# Patient Record
Sex: MALE | Race: BLACK OR AFRICAN AMERICAN | NOT HISPANIC OR LATINO | URBAN - METROPOLITAN AREA
[De-identification: names, ages, dates, MRNs, and addresses within clinical notes are randomized per-mention and may not be internally consistent; named-entity substitution may affect disease eponyms.]

---

## 2018-12-01 ENCOUNTER — OUTPATIENT (OUTPATIENT)
Dept: OUTPATIENT SERVICES | Facility: HOSPITAL | Age: 58
LOS: 1 days | End: 2018-12-01
Payer: MEDICAID

## 2018-12-01 PROCEDURE — G9001: CPT

## 2018-12-21 DIAGNOSIS — Z71.89 OTHER SPECIFIED COUNSELING: ICD-10-CM

## 2022-06-15 ENCOUNTER — INPATIENT (INPATIENT)
Facility: HOSPITAL | Age: 62
LOS: 0 days | Discharge: LEFT BEFORE DISCHARGE | DRG: 311 | End: 2022-06-15
Attending: HOSPITALIST | Admitting: HOSPITALIST
Payer: MEDICAID

## 2022-06-15 VITALS
RESPIRATION RATE: 18 BRPM | DIASTOLIC BLOOD PRESSURE: 80 MMHG | HEART RATE: 59 BPM | OXYGEN SATURATION: 100 % | SYSTOLIC BLOOD PRESSURE: 178 MMHG | TEMPERATURE: 98 F

## 2022-06-15 VITALS
RESPIRATION RATE: 20 BRPM | OXYGEN SATURATION: 99 % | TEMPERATURE: 98 F | SYSTOLIC BLOOD PRESSURE: 146 MMHG | DIASTOLIC BLOOD PRESSURE: 88 MMHG | HEIGHT: 70 IN | HEART RATE: 69 BPM | WEIGHT: 160.06 LBS

## 2022-06-15 DIAGNOSIS — R07.9 CHEST PAIN, UNSPECIFIED: ICD-10-CM

## 2022-06-15 DIAGNOSIS — F10.929 ALCOHOL USE, UNSPECIFIED WITH INTOXICATION, UNSPECIFIED: ICD-10-CM

## 2022-06-15 DIAGNOSIS — Z29.9 ENCOUNTER FOR PROPHYLACTIC MEASURES, UNSPECIFIED: ICD-10-CM

## 2022-06-15 DIAGNOSIS — Z78.9 OTHER SPECIFIED HEALTH STATUS: ICD-10-CM

## 2022-06-15 DIAGNOSIS — F22 DELUSIONAL DISORDERS: ICD-10-CM

## 2022-06-15 DIAGNOSIS — I10 ESSENTIAL (PRIMARY) HYPERTENSION: ICD-10-CM

## 2022-06-15 LAB
ALBUMIN SERPL ELPH-MCNC: 4.2 G/DL — SIGNIFICANT CHANGE UP (ref 3.3–5)
ALP SERPL-CCNC: 99 U/L — SIGNIFICANT CHANGE UP (ref 40–120)
ALT FLD-CCNC: 17 U/L — SIGNIFICANT CHANGE UP (ref 10–45)
AMPHET UR-MCNC: NEGATIVE — SIGNIFICANT CHANGE UP
ANION GAP SERPL CALC-SCNC: 11 MMOL/L — SIGNIFICANT CHANGE UP (ref 5–17)
APAP SERPL-MCNC: <15 UG/ML — SIGNIFICANT CHANGE UP (ref 10–30)
APPEARANCE UR: ABNORMAL
AST SERPL-CCNC: 22 U/L — SIGNIFICANT CHANGE UP (ref 10–40)
BACTERIA # UR AUTO: 0 — SIGNIFICANT CHANGE UP
BARBITURATES UR SCN-MCNC: NEGATIVE — SIGNIFICANT CHANGE UP
BASE EXCESS BLDV CALC-SCNC: 3.4 MMOL/L — HIGH (ref -2–2)
BASOPHILS # BLD AUTO: 0.03 K/UL — SIGNIFICANT CHANGE UP (ref 0–0.2)
BASOPHILS NFR BLD AUTO: 0.5 % — SIGNIFICANT CHANGE UP (ref 0–2)
BENZODIAZ UR-MCNC: NEGATIVE — SIGNIFICANT CHANGE UP
BILIRUB SERPL-MCNC: 0.5 MG/DL — SIGNIFICANT CHANGE UP (ref 0.2–1.2)
BILIRUB UR-MCNC: NEGATIVE — SIGNIFICANT CHANGE UP
BUN SERPL-MCNC: 13 MG/DL — SIGNIFICANT CHANGE UP (ref 7–23)
CA-I SERPL-SCNC: 1.17 MMOL/L — SIGNIFICANT CHANGE UP (ref 1.15–1.33)
CALCIUM SERPL-MCNC: 8.8 MG/DL — SIGNIFICANT CHANGE UP (ref 8.4–10.5)
CHLORIDE BLDV-SCNC: 103 MMOL/L — SIGNIFICANT CHANGE UP (ref 96–108)
CHLORIDE SERPL-SCNC: 103 MMOL/L — SIGNIFICANT CHANGE UP (ref 96–108)
CK MB BLD-MCNC: 1.8 % — SIGNIFICANT CHANGE UP (ref 0–3.5)
CK MB CFR SERPL CALC: 5.4 NG/ML — SIGNIFICANT CHANGE UP (ref 0–6.7)
CK SERPL-CCNC: 300 U/L — HIGH (ref 30–200)
CO2 BLDV-SCNC: 32 MMOL/L — HIGH (ref 22–26)
CO2 SERPL-SCNC: 26 MMOL/L — SIGNIFICANT CHANGE UP (ref 22–31)
COCAINE METAB.OTHER UR-MCNC: POSITIVE
COLOR SPEC: SIGNIFICANT CHANGE UP
CREAT SERPL-MCNC: 0.96 MG/DL — SIGNIFICANT CHANGE UP (ref 0.5–1.3)
DIFF PNL FLD: NEGATIVE — SIGNIFICANT CHANGE UP
EGFR: 91 ML/MIN/1.73M2 — SIGNIFICANT CHANGE UP
EOSINOPHIL # BLD AUTO: 0 K/UL — SIGNIFICANT CHANGE UP (ref 0–0.5)
EOSINOPHIL NFR BLD AUTO: 0 % — SIGNIFICANT CHANGE UP (ref 0–6)
EPI CELLS # UR: 0 /HPF — SIGNIFICANT CHANGE UP
ETHANOL SERPL-MCNC: 13 MG/DL — HIGH (ref 0–10)
GAS PNL BLDV: 134 MMOL/L — LOW (ref 136–145)
GAS PNL BLDV: SIGNIFICANT CHANGE UP
GAS PNL BLDV: SIGNIFICANT CHANGE UP
GLUCOSE BLDV-MCNC: 110 MG/DL — HIGH (ref 70–99)
GLUCOSE SERPL-MCNC: 113 MG/DL — HIGH (ref 70–99)
GLUCOSE UR QL: NEGATIVE — SIGNIFICANT CHANGE UP
HCO3 BLDV-SCNC: 30 MMOL/L — HIGH (ref 22–29)
HCT VFR BLD CALC: 37.2 % — LOW (ref 39–50)
HCT VFR BLDA CALC: 37 % — LOW (ref 39–51)
HGB BLD CALC-MCNC: 12.3 G/DL — LOW (ref 12.6–17.4)
HGB BLD-MCNC: 11.6 G/DL — LOW (ref 13–17)
HYALINE CASTS # UR AUTO: 0 /LPF — SIGNIFICANT CHANGE UP (ref 0–2)
IMM GRANULOCYTES NFR BLD AUTO: 0.3 % — SIGNIFICANT CHANGE UP (ref 0–1.5)
KETONES UR-MCNC: NEGATIVE — SIGNIFICANT CHANGE UP
LACTATE BLDV-MCNC: 1.5 MMOL/L — SIGNIFICANT CHANGE UP (ref 0.7–2)
LEUKOCYTE ESTERASE UR-ACNC: NEGATIVE — SIGNIFICANT CHANGE UP
LYMPHOCYTES # BLD AUTO: 1.7 K/UL — SIGNIFICANT CHANGE UP (ref 1–3.3)
LYMPHOCYTES # BLD AUTO: 29.1 % — SIGNIFICANT CHANGE UP (ref 13–44)
MCHC RBC-ENTMCNC: 23.7 PG — LOW (ref 27–34)
MCHC RBC-ENTMCNC: 31.2 GM/DL — LOW (ref 32–36)
MCV RBC AUTO: 76.1 FL — LOW (ref 80–100)
METHADONE UR-MCNC: NEGATIVE — SIGNIFICANT CHANGE UP
MONOCYTES # BLD AUTO: 0.63 K/UL — SIGNIFICANT CHANGE UP (ref 0–0.9)
MONOCYTES NFR BLD AUTO: 10.8 % — SIGNIFICANT CHANGE UP (ref 2–14)
NEUTROPHILS # BLD AUTO: 3.47 K/UL — SIGNIFICANT CHANGE UP (ref 1.8–7.4)
NEUTROPHILS NFR BLD AUTO: 59.3 % — SIGNIFICANT CHANGE UP (ref 43–77)
NITRITE UR-MCNC: NEGATIVE — SIGNIFICANT CHANGE UP
NRBC # BLD: 0 /100 WBCS — SIGNIFICANT CHANGE UP (ref 0–0)
NT-PROBNP SERPL-SCNC: 94 PG/ML — SIGNIFICANT CHANGE UP (ref 0–300)
OPIATES UR-MCNC: NEGATIVE — SIGNIFICANT CHANGE UP
OXYCODONE UR-MCNC: NEGATIVE — SIGNIFICANT CHANGE UP
PCO2 BLDV: 53 MMHG — SIGNIFICANT CHANGE UP (ref 42–55)
PCP SPEC-MCNC: SIGNIFICANT CHANGE UP
PCP UR-MCNC: NEGATIVE — SIGNIFICANT CHANGE UP
PH BLDV: 7.36 — SIGNIFICANT CHANGE UP (ref 7.32–7.43)
PH UR: 7.5 — SIGNIFICANT CHANGE UP (ref 5–8)
PLATELET # BLD AUTO: 187 K/UL — SIGNIFICANT CHANGE UP (ref 150–400)
PO2 BLDV: 37 MMHG — SIGNIFICANT CHANGE UP (ref 25–45)
POTASSIUM BLDV-SCNC: 4 MMOL/L — SIGNIFICANT CHANGE UP (ref 3.5–5.1)
POTASSIUM SERPL-MCNC: 4 MMOL/L — SIGNIFICANT CHANGE UP (ref 3.5–5.3)
POTASSIUM SERPL-SCNC: 4 MMOL/L — SIGNIFICANT CHANGE UP (ref 3.5–5.3)
PROT SERPL-MCNC: 6.5 G/DL — SIGNIFICANT CHANGE UP (ref 6–8.3)
PROT UR-MCNC: NEGATIVE — SIGNIFICANT CHANGE UP
RAPID RVP RESULT: SIGNIFICANT CHANGE UP
RBC # BLD: 4.89 M/UL — SIGNIFICANT CHANGE UP (ref 4.2–5.8)
RBC # FLD: 14.6 % — HIGH (ref 10.3–14.5)
RBC CASTS # UR COMP ASSIST: 3 /HPF — SIGNIFICANT CHANGE UP (ref 0–4)
SALICYLATES SERPL-MCNC: <2 MG/DL — LOW (ref 15–30)
SAO2 % BLDV: 63.6 % — LOW (ref 67–88)
SARS-COV-2 RNA SPEC QL NAA+PROBE: SIGNIFICANT CHANGE UP
SODIUM SERPL-SCNC: 140 MMOL/L — SIGNIFICANT CHANGE UP (ref 135–145)
SP GR SPEC: 1.02 — SIGNIFICANT CHANGE UP (ref 1.01–1.02)
THC UR QL: POSITIVE
TROPONIN T, HIGH SENSITIVITY RESULT: <6 NG/L — SIGNIFICANT CHANGE UP (ref 0–51)
TROPONIN T, HIGH SENSITIVITY RESULT: <6 NG/L — SIGNIFICANT CHANGE UP (ref 0–51)
UROBILINOGEN FLD QL: ABNORMAL
WBC # BLD: 5.85 K/UL — SIGNIFICANT CHANGE UP (ref 3.8–10.5)
WBC # FLD AUTO: 5.85 K/UL — SIGNIFICANT CHANGE UP (ref 3.8–10.5)
WBC UR QL: 0 /HPF — SIGNIFICANT CHANGE UP (ref 0–5)

## 2022-06-15 PROCEDURE — 83605 ASSAY OF LACTIC ACID: CPT

## 2022-06-15 PROCEDURE — 85025 COMPLETE CBC W/AUTO DIFF WBC: CPT

## 2022-06-15 PROCEDURE — 82947 ASSAY GLUCOSE BLOOD QUANT: CPT

## 2022-06-15 PROCEDURE — 85014 HEMATOCRIT: CPT

## 2022-06-15 PROCEDURE — G0378: CPT

## 2022-06-15 PROCEDURE — 83880 ASSAY OF NATRIURETIC PEPTIDE: CPT

## 2022-06-15 PROCEDURE — 82330 ASSAY OF CALCIUM: CPT

## 2022-06-15 PROCEDURE — 99284 EMERGENCY DEPT VISIT MOD MDM: CPT

## 2022-06-15 PROCEDURE — 85018 HEMOGLOBIN: CPT

## 2022-06-15 PROCEDURE — 99285 EMERGENCY DEPT VISIT HI MDM: CPT

## 2022-06-15 PROCEDURE — 71045 X-RAY EXAM CHEST 1 VIEW: CPT | Mod: 26

## 2022-06-15 PROCEDURE — 84295 ASSAY OF SERUM SODIUM: CPT

## 2022-06-15 PROCEDURE — 80053 COMPREHEN METABOLIC PANEL: CPT

## 2022-06-15 PROCEDURE — 82565 ASSAY OF CREATININE: CPT

## 2022-06-15 PROCEDURE — 71045 X-RAY EXAM CHEST 1 VIEW: CPT

## 2022-06-15 PROCEDURE — 0225U NFCT DS DNA&RNA 21 SARSCOV2: CPT

## 2022-06-15 PROCEDURE — 99223 1ST HOSP IP/OBS HIGH 75: CPT

## 2022-06-15 PROCEDURE — 81001 URINALYSIS AUTO W/SCOPE: CPT

## 2022-06-15 PROCEDURE — 82803 BLOOD GASES ANY COMBINATION: CPT

## 2022-06-15 PROCEDURE — 84484 ASSAY OF TROPONIN QUANT: CPT

## 2022-06-15 PROCEDURE — 93010 ELECTROCARDIOGRAM REPORT: CPT

## 2022-06-15 PROCEDURE — 80307 DRUG TEST PRSMV CHEM ANLYZR: CPT

## 2022-06-15 PROCEDURE — 82550 ASSAY OF CK (CPK): CPT

## 2022-06-15 PROCEDURE — 84132 ASSAY OF SERUM POTASSIUM: CPT

## 2022-06-15 PROCEDURE — 82553 CREATINE MB FRACTION: CPT

## 2022-06-15 PROCEDURE — 96372 THER/PROPH/DIAG INJ SC/IM: CPT

## 2022-06-15 PROCEDURE — 82435 ASSAY OF BLOOD CHLORIDE: CPT

## 2022-06-15 RX ORDER — ACETAMINOPHEN 500 MG
650 TABLET ORAL EVERY 6 HOURS
Refills: 0 | Status: DISCONTINUED | OUTPATIENT
Start: 2022-06-15 | End: 2022-06-15

## 2022-06-15 RX ORDER — ASPIRIN/CALCIUM CARB/MAGNESIUM 324 MG
1 TABLET ORAL
Qty: 30 | Refills: 0
Start: 2022-06-15 | End: 2022-07-14

## 2022-06-15 RX ORDER — LANOLIN ALCOHOL/MO/W.PET/CERES
3 CREAM (GRAM) TOPICAL AT BEDTIME
Refills: 0 | Status: DISCONTINUED | OUTPATIENT
Start: 2022-06-15 | End: 2022-06-15

## 2022-06-15 RX ORDER — ONDANSETRON 8 MG/1
4 TABLET, FILM COATED ORAL EVERY 8 HOURS
Refills: 0 | Status: DISCONTINUED | OUTPATIENT
Start: 2022-06-15 | End: 2022-06-15

## 2022-06-15 RX ORDER — THIAMINE MONONITRATE (VIT B1) 100 MG
100 TABLET ORAL DAILY
Refills: 0 | Status: DISCONTINUED | OUTPATIENT
Start: 2022-06-15 | End: 2022-06-15

## 2022-06-15 RX ORDER — ASPIRIN/CALCIUM CARB/MAGNESIUM 324 MG
324 TABLET ORAL ONCE
Refills: 0 | Status: COMPLETED | OUTPATIENT
Start: 2022-06-15 | End: 2022-06-15

## 2022-06-15 RX ORDER — FOLIC ACID 0.8 MG
1 TABLET ORAL DAILY
Refills: 0 | Status: DISCONTINUED | OUTPATIENT
Start: 2022-06-15 | End: 2022-06-15

## 2022-06-15 RX ADMIN — Medication 324 MILLIGRAM(S): at 02:22

## 2022-06-15 RX ADMIN — Medication 2 MILLIGRAM(S): at 03:30

## 2022-06-15 NOTE — ED PROVIDER NOTE - PHYSICAL EXAMINATION
GENERAL: ill appearing  HEAD: normocephalic, atraumatic  HENT: airway intact, neck supple  EYES:  normal conjunctiva  CARDIAC: regular rate and rhythm, normal S1S2, no appreciable murmurs, 2+ pulses in UE/LE b/l  PULM: normal breath sounds, clear to ascultation bilaterally, no rales, rhonchi, wheezing  GI: abdomen nondistended, soft, nontender, no guarding, rebound tenderness  NEURO: no focal motor or sensory deficits, CN2-12 intact, normal speech, PERRLA, EOMI, normal gait, AAOx3  MSK: no peripheral edema, no calf tenderness b/l  SKIN: well-perfused, extremities warm, no visible rashes  PSYCH: appropriate mood and affect GENERAL: ill appearing  HEAD: normocephalic, atraumatic  HENT: airway intact, neck supple  EYES:  normal conjunctiva  CARDIAC: regular rate and rhythm, normal S1S2, no appreciable murmurs, 2+ pulses in UE/LE b/l  PULM: normal breath sounds, clear to ascultation bilaterally, no rales, rhonchi, wheezing  GI: abdomen nondistended, soft, nontender, no guarding, rebound tenderness  NEURO: no focal motor or sensory deficits  MSK: no peripheral edema, no calf tenderness b/l  SKIN: well-perfused, extremities warm, no visible rashes  PSYCH: appropriate mood and affect

## 2022-06-15 NOTE — CHART NOTE - NSCHARTNOTEFT_GEN_A_CORE
MRN-[id number]  DENILSON ADKINS    Briefly, this is a 61M hx of ?HTN presented due to chest pain x1 day, found to STEs on EKG with neg serial troponins.   Called by RN due to patient wanting to sign out AMA. Patient stated that at this time his chest pain has resolved and he will follow up outpatient for further testing; he is refusing to stay for inpatient workup and wants to leave AMA.     Patient is AAOx3. I explained at length to the patient the risks of signing out AMA including but not limited to harm, injury, or death. I explained the risks, benefits and alternatives to treatment as well as the attendant risks of refusing treatment at this time. I offered to answer any questions and fully answered any such questions. We believe that the patient fully understands what has been explained and answered.   Attending  ......... notified and made aware of the above. Patient signed AMA form and accepts responsibility for any and all results of this decision.    Damari Aaron PA-C   Department of Medicine   [id number]

## 2022-06-15 NOTE — ED ADULT NURSE REASSESSMENT NOTE - NS ED NURSE REASSESS COMMENT FT1
pt A&Ox4 but appearing to hallucinating that someone is following him and out to hurt him. states they're following him from hotel to hospital, knocking on hospital window and flashing lights. Self took out both bilat IV's. expressed to pt the need to stay and be evaluated alongside with MD Mills. Pt then walked out of room and began yelling at other ER pt about being followed and someone trying to harm him. Code grey then called. Security at bedside with additional RN's, medications pulled up/drawn/given (see EMAR) as per MD. Pt bound to no longer have capacity by MD Mills.  After verbal deescalation and medication pt assisted back into bed, 1:1 at bedside, pt now calm and cooperative, belongings gathered / given to security.

## 2022-06-15 NOTE — ED PROVIDER NOTE - OBJECTIVE STATEMENT
62yo M w/ history of HTN? coming in w/ acute on set chest pain that began in bed. Worse w/ exertion. Every day smoker. Called EMS and initially refused aspirin en route. 62yo M w/ history of HTN? coming in w/ acute on set chest pain that began in bed. Worse w/ exertion. Every day smoker. Called EMS and initially refused aspirin en route. Code STEMI called on arrival.

## 2022-06-15 NOTE — H&P ADULT - PROBLEM SELECTOR PLAN 1
Initial EKG with STEs V3-5, with neg trop x2, s/p loading ASA 324mg x1. Possible early ACS vs ETOH-associated GERD or gastritis.   Currently pain resolved  - cont to monitor  - f/u TTE  - check EKG and trops with any changes in chest pain  - monitor on tele, maintain K>4 and Mg>2  - appreciate cards eval, f/u recs Initial EKG with STEs V3-5, with neg trop x2, s/p loading ASA 324mg x1. Possible early ACS vs ETOH-associated GERD or gastritis.   Currently pain resolved  - cont to monitor  - f/u TTE  - check EKG and trops with any changes in chest pain  - monitor on tele, maintain K>4 and Mg>2  - appreciate cards eval, f/u recs  - acetaminophen PRN for pain  - alLOH/mgOH for dyspepsia Initial EKG with STEs V3-5, with neg trop x2, s/p loading ASA 324mg x1. Possible early ACS vs ETOH-associated GERD or gastritis.   Currently pain resolved  - cont to monitor  - f/u TTE  - check EKG and trops with any changes in chest pain  - monitor on tele, maintain K>4 and Mg>2  - appreciate cards eval, f/u recs, will need to determine if cath or stress test is needed inpt  - acetaminophen PRN for pain  - alLOH/mgOH for dyspepsia

## 2022-06-15 NOTE — H&P ADULT - NSHPLABSRESULTS_GEN_ALL_CORE
The labs were reviewed by me. Imaging was reviewed by me. EKG was reviewed by me.                        11.6   5.85  )-----------( 187      ( 15 Trevor 2022 02:28 )             37.2       06-15    140  |  103  |  13  ----------------------------<  113<H>  4.0   |  26  |  0.96    Ca    8.8      15 Trevor 2022 02:28    TPro  6.5  /  Alb  4.2  /  TBili  0.5  /  DBili  x   /  AST  22  /  ALT  17  /  AlkPhos  99  -15          Troponin T, High Sensitivity (06.15.22 @ 02:28)    Troponin T, High Sensitivity Result: <6: Specimen not hemolyzed    Troponin T, High Sensitivity (06.15.22 @ 07:18)    Troponin T, High Sensitivity Result: <6: Specimen not hemolyzed    Creatine Kinase, Serum (06.15.22 @ 02:28)    Creatine Kinase, Serum: 300 U/L    CKMB Mass Assay (06.15.22 @ 02:28)    CKMB Units: 5.4 ng/mL    CPK Mass Assay %: 1.8 %    Alcohol, Blood (06.15.22 @ 03:57)    Alcohol, Blood: 13 mg/dL  Acetaminophen Level, Serum (06.15.22 @ 03:57)    Acetaminophen Level, Serum: <15 ug/mL  Salicylate Level, Serum (06.15.22 @ 03:57)    Salicylate Level, Serum: <2.0 mg/dL    Blood Gas Venous - Lactate (06.15.22 @ 02:08)    Blood Gas Venous - Lactate: 1.5 mmol/L    Respiratory Viral Panel with COVID-19 by EDUAR (06.15.22 @ 02:58)    [facility]    SARS-CoV-2: NotDete: This Respiratory Panel uses polymerase chain reaction (PCR) to detect for  adenovirus; coronavirus (HKU1, NL63, 229E, OC43); human metapneumovirus  (hMPV); human enterovirus/rhinovirus (Entero/RV); influenza A; influenza  A/H1; influenza A/H3; influenza A/H1-2009; influenza B; parainfluenza  viruses 1, 2, 3, 4; respiratory syncytial virus; Mycoplasma pneumoniae;  Chlamydophila pneumoniae; and SARS-CoV-2.      EK/15/22 0200 - VR 71, NSR, , LATRICE V3-V5  6/15/22 0218 - VR 71, NSR, , LATRICE V3-V4    RADIOLOGY:  < from: Xray Chest 1 View- PORTABLE-Urgent (06.15.22 @ 02:59) >    IMPRESSION:  No evidence of acute pulmonary disease.    < end of copied text >

## 2022-06-15 NOTE — H&P ADULT - NSHPPHYSICALEXAM_GEN_ALL_CORE
Vital Signs Last 24 Hrs  T(C): 36.5 (15 Trevor 2022 09:07), Max: 36.5 (15 Trevor 2022 06:35)  T(F): 97.7 (15 Trevor 2022 09:07), Max: 97.7 (15 Trevor 2022 06:35)  HR: 56 (15 Trevor 2022 09:07) (53 - 69)  BP: 156/89 (15 Trevor 2022 09:07) (139/85 - 158/88)  BP(mean): --  RR: 18 (15 Trevor 2022 09:07) (16 - 20)  SpO2: 100% (15 Trevor 2022 09:07) (99% - 100%)    CONSTITUTIONAL: NAD, well-developed, agitated  EYES: PERRLA; conjunctiva and sclera clear  ENMT: Moist oral mucosa, no pharyngeal injection or exudates; normal dentition  NECK: Supple, no palpable masses  RESPIRATORY: Normal respiratory effort; lungs are clear to auscultation bilaterally  CARDIOVASCULAR: Regular rate and rhythm, normal S1 and S2, no murmur/rub/gallop; No lower extremity edema; Peripheral pulses are 2+ bilaterally  ABDOMEN: Nontender to palpation, normoactive bowel sounds, no rebound/guarding; No hepatosplenomegaly  MUSCULOSKELETAL:  Normal gait; no clubbing or cyanosis of digits; no joint swelling or tenderness to palpation  PSYCH: A+O to person, place, and time  NEUROLOGY: CN 2-12 are intact and symmetric; no gross sensory deficits   SKIN: No rashes; no palpable lesions

## 2022-06-15 NOTE — DISCHARGE NOTE PROVIDER - NSDCCPCAREPLAN_GEN_ALL_CORE_FT
PRINCIPAL DISCHARGE DIAGNOSIS  Diagnosis: Left against medical advice  Assessment and Plan of Treatment: PATIENT LEFT AMA; PLEASE FOLLOW UP WITH PCP WITHIN 24-48 HOURS.      SECONDARY DISCHARGE DIAGNOSES  Diagnosis: Acute chest pain  Assessment and Plan of Treatment: PATIENT LEFT AMA; PLEASE FOLLOW UP WITH PCP WITHIN 24-48 HOURS.

## 2022-06-15 NOTE — H&P ADULT - PROBLEM SELECTOR PLAN 2
+CHELSIE level of 13, likely with mild intoxication on arrival.   He denies any ETOH or drug use and report person following him concerning for paranoia.   - will start symptom triggered CIWA monitoring  - consult   - banana bag x1, MTV, folate +CHELSIE level of 13, likely with mild intoxication on arrival.   He denies any ETOH or drug use and report person following him concerning for paranoia.   - will start symptom triggered CIWA monitoring  - consult CHRISTINA  - ALANIS, folate

## 2022-06-15 NOTE — PROVIDER CONTACT NOTE (OTHER) - ACTION/TREATMENT ORDERED:
provider lobo made aware  provider at bedside  continue to monitor provider lobo made aware  provider at bedside  pt understands the risks to signing out AMA and is aware  continue to monitor

## 2022-06-15 NOTE — CHART NOTE - NSCHARTNOTEFT_GEN_A_CORE
EKG showing Repolarization abnormalties in the setting of LVH  Troponin negative    --Please ensure patient loaded with   --Please trend troponin until downtrending, please repeat EKG with every troponin  --Please order a transthoracic echo stat  --Please monitor on telemetry  --K>4, Mg>2  --If patient develops hemodynamic instability, please call    Jcarlos Holliday MD  Cardiology Fellow  [phone]

## 2022-06-15 NOTE — BH CONSULTATION LIAISON ASSESSMENT NOTE - CURRENT MEDICATION
MEDICATIONS  (STANDING):  folic acid 1 milliGRAM(s) Oral daily  multivitamin 1 Tablet(s) Oral daily  thiamine 100 milliGRAM(s) Oral daily    MEDICATIONS  (PRN):  acetaminophen     Tablet .. 650 milliGRAM(s) Oral every 6 hours PRN Temp greater or equal to 38C (100.4F), Mild Pain (1 - 3)  aluminum hydroxide/magnesium hydroxide/simethicone Suspension 30 milliLiter(s) Oral every 4 hours PRN Dyspepsia  LORazepam     Tablet 2 milliGRAM(s) Oral every 2 hours PRN CIWA-Ar score increase by 2 points and a total score of 7 or less  LORazepam   Injectable 2 milliGRAM(s) IV Push every 2 hours PRN CIWA-Ar score increase by 2 points and a total score of 7 or less  LORazepam   Injectable 2 milliGRAM(s) IntraMuscular every 2 hours PRN CIWA-Ar score increase by 2 points and a total score of 7 or less  melatonin 3 milliGRAM(s) Oral at bedtime PRN Insomnia  ondansetron Injectable 4 milliGRAM(s) IV Push every 8 hours PRN Nausea and/or Vomiting

## 2022-06-15 NOTE — DISCHARGE NOTE PROVIDER - HOSPITAL COURSE
60yo M w/ history of HTN? coming in w/ acute on set chest pain that began in bed. Worse w/ exertion. Every day smoker. Called EMS and initially refused aspirin en route. Code STEMI called on arrival. Found to STEs on EKG with neg serial troponins. Patient refusing further inpatient workup and wants to leave AMA.   I explained at length to the patient the risks of signing out AMA including but not limited to harm, injury, or death. I explained the risks, benefits and alternatives to treatment as well as the risks of refusing treatment at this time. I offered to answer any questions and fully answered any such questions. We believe that the patient fully understands what has been explained and answered. Patient signed AMA form and accepts responsibility for any and all results of this decision.

## 2022-06-15 NOTE — BH CONSULTATION LIAISON ASSESSMENT NOTE - SUMMARY
Pt is a 60 y/o SAAM with no past psychiatric history, presents with chest pain, as a staff member from a hotel he has been residing at called 911 last night. pt stated his chest was hurting, now feeling better overnight, and wishes to leave AMA, refusing echo and possible need for stress test. the medical attending was at bedside during evaluation, explained to patient that cardiology was recommending the above procedures to further delineate possible causes for patient's chest discomfort. pt was explained risks of leaving AMA, such as possible MI and possible death.  pt was AOA x 3, denies depression/anxiety, reports he is in process of moving from one hotel to another, and wanted to get back to work. he denies si/hi, denies hx of manic or psychotic symptoms. sleep and appetite have been fair. he denies substance abuse issues. pt was calm and cooperative during interview. denies panic attacks, no current anxiety related symptoms.

## 2022-06-15 NOTE — ED ADULT NURSE REASSESSMENT NOTE - NS ED NURSE REASSESS COMMENT FT1
Pt removed self from cardiac monitor. pt requesting to leave stating his sister is coming to get him. reports pain has been better since getting the aspirin and this is the 'same thing as last time, its just from moving things'.   MD Mills and Bimal aware, to bedside to speak with pt

## 2022-06-15 NOTE — BH CONSULTATION LIAISON ASSESSMENT NOTE - DESCRIPTION
single, employed, lives alone in [place], currently in hotel while apartment gets renovated. no children.

## 2022-06-15 NOTE — PROVIDER CONTACT NOTE (OTHER) - ASSESSMENT
pt is a&ox4, VSS  refusing to wear tele monitor   pt called EMS earlier c/o cp and says it has now resolved and states that there is no reason for him to be here  pt on 1:1 for agitation and paranoia- minda low risk- last score was a 2

## 2022-06-15 NOTE — ED PROVIDER NOTE - NS ED ROS FT
General: denies fever, chills  HENT: denies nasal congestion, rhinorrhea  Eyes: denies visual changes, blurred vision  CV: chest pain  Resp: denies difficulty breathing, cough  Abdominal: denies nausea, vomiting, diarrhea, abdominal pain  : denies urinary pain or discharge  MSK: denies muscle aches, leg swelling  Neuro: denies headaches, numbness, tingling  Skin: denies rashes, bruises

## 2022-06-15 NOTE — BH CONSULTATION LIAISON ASSESSMENT NOTE - NSBHCHARTREVIEWVS_PSY_A_CORE FT
Vital Signs Last 24 Hrs  T(C): 36.5 (15 Trevor 2022 09:07), Max: 36.5 (15 Trevor 2022 06:35)  T(F): 97.7 (15 Trevor 2022 09:07), Max: 97.7 (15 Trevor 2022 06:35)  HR: 56 (15 Trevor 2022 09:07) (53 - 69)  BP: 156/89 (15 Trevor 2022 09:07) (139/85 - 158/88)  BP(mean): --  RR: 18 (15 Trevor 2022 09:07) (16 - 20)  SpO2: 100% (15 Treovr 2022 09:07) (99% - 100%)

## 2022-06-15 NOTE — H&P ADULT - ASSESSMENT
61M hx of ?HTN presented due to chest pain x1 day, found to STEs on EKG with neg serial troponins and also with +CHELSIE and paranoia.

## 2022-06-15 NOTE — H&P ADULT - PROBLEM SELECTOR PLAN 3
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 No report of SI/HI.  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 No report of SI/HI.   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 No report of SI/HI.   - will consult   - attempted to speak to family and boss for collateral but pt refused to give number  - cont to monitor on constant obs  - check folate, b12, UA, utox

## 2022-06-15 NOTE — H&P ADULT - NSHPREVIEWOFSYSTEMS_GEN_ALL_CORE
REVIEW OF SYSTEMS:  CONSTITUTIONAL: No weakness, fevers or chills  EYES/ENT: No visual changes;  No vertigo or throat pain   NECK: No pain or stiffness  RESPIRATORY: +non productive cough, No wheezing, hemoptysis; No shortness of breath  CARDIOVASCULAR: +chest pain, no palpitations  GASTROINTESTINAL: No abdominal or epigastric pain. No nausea, vomiting, or hematemesis; No diarrhea or constipation. No melena or hematochezia.  GENITOURINARY: No dysuria, frequency or hematuria  NEUROLOGICAL: No numbness or weakness  SKIN: No itching, burning, rashes, or lesions   All other review of systems is negative unless indicated above.

## 2022-06-15 NOTE — ED ADULT NURSE REASSESSMENT NOTE - NS ED NURSE REASSESS COMMENT FT1
Report received from Ronna PERKINS. AOx3, answering questions. Unlabored, spontaneous respirations, NAD. 1:1 at bedside for pt safety. Admitted to medicine, telemetry awaiting bed placement at this time. Pt denies CP, SOB, n/v/d at this time.

## 2022-06-15 NOTE — CONSULT NOTE ADULT - SUBJECTIVE AND OBJECTIVE BOX
DATE OF SERVICE: 06-15-22 @ 09:38    CHIEF COMPLAINT:Patient is a 61y old  Male who presents with a chief complaint of     HISTORY OF PRESENT ILLNESS:  62yo M w/ history of HTN? coming in w/ acute on set chest pain that began in bed. Worse w/ exertion. Every day smoker. Called EMS and initially refused aspirin en route. Code STEMI called on arrival.    PAST MEDICAL & SURGICAL HISTORY:  No pertinent past medical history      No significant past surgical history      MEDICATIONS:        acetaminophen     Tablet .. 650 milliGRAM(s) Oral every 6 hours PRN  melatonin 3 milliGRAM(s) Oral at bedtime PRN  ondansetron Injectable 4 milliGRAM(s) IV Push every 8 hours PRN    aluminum hydroxide/magnesium hydroxide/simethicone Suspension 30 milliLiter(s) Oral every 4 hours PRN          FAMILY HISTORY:  No pertinent family history in first degree relatives        Non-contributory    SOCIAL HISTORY:    + smoker    Allergies:    Allergy Status Unknown    Intolerances    	    REVIEW OF SYSTEMS:  CONSTITUTIONAL: No fever  EYES: No eye pain, visual disturbances, or discharge  ENMT:  No difficulty hearing, tinnitus  NECK: No pain or stiffness  RESPIRATORY: No cough, wheezing,  CARDIOVASCULAR: No chest pain, palpitations, passing out, dizziness, or leg swelling  GASTROINTESTINAL:  No nausea, vomiting, diarrhea or constipation. No melena.  GENITOURINARY: No dysuria, hematuria  NEUROLOGICAL: No stroke like symptoms  SKIN: No burning or lesions   ENDOCRINE: No heat or cold intolerance  MUSCULOSKELETAL: No joint pain or swelling  PSYCHIATRIC: No  anxiety, mood swings  HEME/LYMPH: No bleeding gums  ALLERGY AND IMMUNOLOGIC: No hives or eczema	    All other ROS negative    PHYSICAL EXAM:  T(C): 36.5 (06-15-22 @ 09:07), Max: 36.5 (06-15-22 @ 06:35)  HR: 56 (06-15-22 @ 09:07) (53 - 69)  BP: 156/89 (06-15-22 @ 09:07) (139/85 - 158/88)  RR: 18 (06-15-22 @ 09:07) (16 - 20)  SpO2: 100% (06-15-22 @ 09:07) (99% - 100%)  Wt(kg): --  I&O's Summary      Appearance: Normal	  HEENT:   Normal oral mucosa, EOMI	  Cardiovascular:  S1 S2, No JVD,    Respiratory: Lungs clear to auscultation	  Psychiatry: Alert  Gastrointestinal:  Soft, Non-tender, + BS	  Skin: No rashes   Neurologic: Non-focal  Extremities:  No edema  Vascular: Peripheral pulses palpable    	    	  	  CARDIAC MARKERS:  Labs personally reviewed by me                                  11.6   5.85  )-----------( 187      ( 15 Trevor 2022 02:28 )             37.2     06-15    140  |  103  |  13  ----------------------------<  113<H>  4.0   |  26  |  0.96    Ca    8.8      15 Trevor 2022 02:28    TPro  6.5  /  Alb  4.2  /  TBili  0.5  /  DBili  x   /  AST  22  /  ALT  17  /  AlkPhos  99  06-15          EKG: Personally reviewed by me - SR with twi III, LATRICE V2-V4 (<2mm)  Radiology: Personally reviewed by me -     Xray Chest 1 View- PORTABLE-Urgent (06.15.22 @ 02:59)   The lungs are clear.  No pleural effusion or pneumothorax.  Unable to accurately assess cardiomediastinal silhouette size in this   projection.   No acute osseous abnormality.  No evidence of acute pulmonary disease.        Assessment /Plan:     Mr. Roy is a 60 yo male who reports no PMH and takes no medications who presents with right sided chest pain radiating to the mid-sternum while laying in bed which did not self resolve with rest. He states that he has had chest pain on and off for a long time but could not be more descriptive. Denies shortness of breath, palpitations, edema, orthopnea or syncope.    Problem/Plan -1  Problem: Chest Pain  - EKG with twi III, minimal LATRICE V2-V4  - Trop negative; CKMB elevated at 300 (possibly secondary to ETOH abuse?)  - CXR wnl, no acute pulmonary disease  - Will obtain formal TTE    Problem/Plan -2  Problem: HTN  - BP currently wnl not on any therapy  - Cont to monitor     Problem/Plan -3  Problem: Elevated Alcohol Level  - Alcohol level 13   - Would consider [facility] protocol      Differential diagnosis and plan of care discussed with patient after the evaluation. Counseling on diet, nutritional counseling, weight management, exercise and medication compliance was done.   Advanced care planning/advanced directives discussed with patient/family. DNR status including forceful chest compressions to attempt to restart the heart, ventilator support/artificial breathing, electric shock, artificial nutrition, health care proxy, Molst form all discussed with pt. Pt wishes to consider. More than fifteen minutes spent on discussing advanced directives.     Lita Paez [facility]  Ron Ngo DO [facility]  Cardiovascular Medicine  [address] [place] [address], [address]  Office [phone]  Cell [phone]

## 2022-06-15 NOTE — ED PROVIDER NOTE - ATTENDING CONTRIBUTION TO CARE
RGUJRAL 62yo male hx listed BIB EMS for chest pain. Patient states pain is substernal non radiating and 10/10. Denies any n/v, sob, palp. +Smoker. As per EMS pt declined EMS en route. on arrival patient appears in mild distress due to cp. Patient's chest is clear to ausculation, +s1s2. Abdomen is soft nd/nt +BS. Extremity with no swelling or calf tenderness. B/L BP equal and pulses.   Discussed with patient at length to take aspirin in the ED. Cards consulted with active symptoms.     Pt later states he would like to leave and feels better. Discussed at length with patient, now stating someone is following him and he hears someone knocking in the ED window. Pt talking to other patients in the ER that he is being followed by others. Attempted to call family no answer. Concern for paranoia at this time, pt pulled out IV bleeding. Pt given ativan due to agitation and placed on 1:1. Will continue medical work up for chest pain at this time and 1:1 observation and admitting doctor plan discussed to call psych in am.

## 2022-06-15 NOTE — ED PROVIDER NOTE - CLINICAL SUMMARY MEDICAL DECISION MAKING FREE TEXT BOX
60yo M w/ history of HTN? coming in w/ acute on set chest pain that began in bed; EKG concerning for LATRICE in V3, V4 and V5; code stemi called

## 2022-06-15 NOTE — DISCHARGE NOTE NURSING/CASE MANAGEMENT/SOCIAL WORK - PATIENT PORTAL LINK FT
You can access the FollowMyHealth Patient Portal offered by [facility] by registering at the following website: [email or web address]. By joining [email or web address] FollowMyHealth portal, you will also be able to view your health information using other applications (apps) compatible with our system.
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
## 2022-06-15 NOTE — ED ADULT NURSE NOTE - OBJECTIVE STATEMENT
pt is a 61 y.o male coming from a hotel via EMS for chest pain. pt states he had this pain one time before but "not as bad". EMS states pt refused baby aspirin in ambulance . Pt states he was walking around hotel, picking things up and the pain came on starting from R side of chest migrating to L side of chest along with bilat leg tingling and SOB. pt states he has blurry vision that isn't new, and experiences headaches frequently. pt states he smokes cigarettes frequently.  Upon assessment, pt A&Ox4, breathing non labored on room air, skin warm/dry/intact, afebrile, denies fevers/chills/diarrhea/new vision changes/back pain/diaphoresis. Hep lock x2 placed/intact, vitals taken, labs drawn/sent, meds ordered given (see EMAR), EKG done, MD Persaud at bedside evaluating pt, all safety measures in place.

## 2022-06-15 NOTE — DISCHARGE NOTE NURSING/CASE MANAGEMENT/SOCIAL WORK - NSDCPEFALRISK_GEN_ALL_CORE
For information on Fall & Injury Prevention, visit: [email or web address] OR  [email or web address] OR  https://www.cdc.gov/steadi/patient.html

## 2022-06-15 NOTE — H&P ADULT - NSHPSOCIALHISTORY_GEN_ALL_CORE
Single.  Lives in hotel currently.   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  Smokes 1/2ppd x20 yr.   Denies ETOH use or Drug use.

## 2022-12-14 ENCOUNTER — EMERGENCY (EMERGENCY)
Facility: HOSPITAL | Age: 62
LOS: 1 days | Discharge: HOME | End: 2022-12-14
Admitting: EMERGENCY MEDICINE

## 2022-12-14 VITALS
DIASTOLIC BLOOD PRESSURE: 86 MMHG | SYSTOLIC BLOOD PRESSURE: 141 MMHG | HEART RATE: 64 BPM | OXYGEN SATURATION: 100 % | RESPIRATION RATE: 16 BRPM | TEMPERATURE: 99 F

## 2022-12-14 DIAGNOSIS — F32.9 MAJOR DEPRESSIVE DISORDER, SINGLE EPISODE, UNSPECIFIED: ICD-10-CM

## 2022-12-14 LAB
ALBUMIN SERPL ELPH-MCNC: 4.4 G/DL — SIGNIFICANT CHANGE UP (ref 3.3–5)
ALP SERPL-CCNC: 118 U/L — SIGNIFICANT CHANGE UP (ref 40–120)
ALT FLD-CCNC: 18 U/L — SIGNIFICANT CHANGE UP (ref 4–41)
ANION GAP SERPL CALC-SCNC: 11 MMOL/L — SIGNIFICANT CHANGE UP (ref 7–14)
APAP SERPL-MCNC: <10 UG/ML — LOW (ref 15–25)
AST SERPL-CCNC: 17 U/L — SIGNIFICANT CHANGE UP (ref 4–40)
BASOPHILS # BLD AUTO: 0.05 K/UL — SIGNIFICANT CHANGE UP (ref 0–0.2)
BASOPHILS NFR BLD AUTO: 0.8 % — SIGNIFICANT CHANGE UP (ref 0–2)
BILIRUB SERPL-MCNC: 0.7 MG/DL — SIGNIFICANT CHANGE UP (ref 0.2–1.2)
BUN SERPL-MCNC: 15 MG/DL — SIGNIFICANT CHANGE UP (ref 7–23)
CALCIUM SERPL-MCNC: 9.6 MG/DL — SIGNIFICANT CHANGE UP (ref 8.4–10.5)
CHLORIDE SERPL-SCNC: 98 MMOL/L — SIGNIFICANT CHANGE UP (ref 98–107)
CO2 SERPL-SCNC: 27 MMOL/L — SIGNIFICANT CHANGE UP (ref 22–31)
CREAT SERPL-MCNC: 1.05 MG/DL — SIGNIFICANT CHANGE UP (ref 0.5–1.3)
EGFR: 81 ML/MIN/1.73M2 — SIGNIFICANT CHANGE UP
EOSINOPHIL # BLD AUTO: 0.11 K/UL — SIGNIFICANT CHANGE UP (ref 0–0.5)
EOSINOPHIL NFR BLD AUTO: 1.8 % — SIGNIFICANT CHANGE UP (ref 0–6)
ETHANOL SERPL-MCNC: <10 MG/DL — SIGNIFICANT CHANGE UP
FLUAV AG NPH QL: SIGNIFICANT CHANGE UP
FLUBV AG NPH QL: SIGNIFICANT CHANGE UP
GLUCOSE SERPL-MCNC: 120 MG/DL — HIGH (ref 70–99)
HCT VFR BLD CALC: 42 % — SIGNIFICANT CHANGE UP (ref 39–50)
HGB BLD-MCNC: 13.1 G/DL — SIGNIFICANT CHANGE UP (ref 13–17)
IANC: 3.78 K/UL — SIGNIFICANT CHANGE UP (ref 1.8–7.4)
IMM GRANULOCYTES NFR BLD AUTO: 0.2 % — SIGNIFICANT CHANGE UP (ref 0–0.9)
LYMPHOCYTES # BLD AUTO: 1.75 K/UL — SIGNIFICANT CHANGE UP (ref 1–3.3)
LYMPHOCYTES # BLD AUTO: 28.3 % — SIGNIFICANT CHANGE UP (ref 13–44)
MCHC RBC-ENTMCNC: 23.7 PG — LOW (ref 27–34)
MCHC RBC-ENTMCNC: 31.2 GM/DL — LOW (ref 32–36)
MCV RBC AUTO: 75.9 FL — LOW (ref 80–100)
MONOCYTES # BLD AUTO: 0.49 K/UL — SIGNIFICANT CHANGE UP (ref 0–0.9)
MONOCYTES NFR BLD AUTO: 7.9 % — SIGNIFICANT CHANGE UP (ref 2–14)
NEUTROPHILS # BLD AUTO: 3.78 K/UL — SIGNIFICANT CHANGE UP (ref 1.8–7.4)
NEUTROPHILS NFR BLD AUTO: 61 % — SIGNIFICANT CHANGE UP (ref 43–77)
NRBC # BLD: 0 /100 WBCS — SIGNIFICANT CHANGE UP (ref 0–0)
NRBC # FLD: 0 K/UL — SIGNIFICANT CHANGE UP (ref 0–0)
PLATELET # BLD AUTO: 220 K/UL — SIGNIFICANT CHANGE UP (ref 150–400)
POTASSIUM SERPL-MCNC: 4.7 MMOL/L — SIGNIFICANT CHANGE UP (ref 3.5–5.3)
POTASSIUM SERPL-SCNC: 4.7 MMOL/L — SIGNIFICANT CHANGE UP (ref 3.5–5.3)
PROT SERPL-MCNC: 7.1 G/DL — SIGNIFICANT CHANGE UP (ref 6–8.3)
RBC # BLD: 5.53 M/UL — SIGNIFICANT CHANGE UP (ref 4.2–5.8)
RBC # FLD: 14.4 % — SIGNIFICANT CHANGE UP (ref 10.3–14.5)
RSV RNA NPH QL NAA+NON-PROBE: SIGNIFICANT CHANGE UP
SALICYLATES SERPL-MCNC: <0.3 MG/DL — LOW (ref 15–30)
SARS-COV-2 RNA SPEC QL NAA+PROBE: SIGNIFICANT CHANGE UP
SODIUM SERPL-SCNC: 136 MMOL/L — SIGNIFICANT CHANGE UP (ref 135–145)
TOXICOLOGY SCREEN, DRUGS OF ABUSE, SERUM RESULT: SIGNIFICANT CHANGE UP
TSH SERPL-MCNC: 1 UIU/ML — SIGNIFICANT CHANGE UP (ref 0.27–4.2)
WBC # BLD: 6.19 K/UL — SIGNIFICANT CHANGE UP (ref 3.8–10.5)
WBC # FLD AUTO: 6.19 K/UL — SIGNIFICANT CHANGE UP (ref 3.8–10.5)

## 2022-12-14 PROCEDURE — 93010 ELECTROCARDIOGRAM REPORT: CPT

## 2022-12-14 PROCEDURE — 99285 EMERGENCY DEPT VISIT HI MDM: CPT

## 2022-12-14 PROCEDURE — 70498 CT ANGIOGRAPHY NECK: CPT | Mod: 26,MA

## 2022-12-14 RX ORDER — HALOPERIDOL DECANOATE 100 MG/ML
5 INJECTION INTRAMUSCULAR ONCE
Refills: 0 | Status: COMPLETED | OUTPATIENT
Start: 2022-12-14 | End: 2022-12-14

## 2022-12-14 RX ADMIN — Medication 2 MILLIGRAM(S): at 20:34

## 2022-12-14 RX ADMIN — HALOPERIDOL DECANOATE 5 MILLIGRAM(S): 100 INJECTION INTRAMUSCULAR at 20:34

## 2022-12-14 NOTE — ED ADULT NURSE REASSESSMENT NOTE - NS ED NURSE REASSESS COMMENT FT1
Pt brought to CT by self/Tech. NAD, pt offers no complaints, calm and cooperative at this time. No imminent signs of desire to self harm.  IV line placed prior to CT and removed after. Pt moved back in front of nursing station, 1 to 1 remains observing pt.

## 2022-12-14 NOTE — ED BEHAVIORAL HEALTH ASSESSMENT NOTE - HPI (INCLUDE ILLNESS QUALITY, SEVERITY, DURATION, TIMING, CONTEXT, MODIFYING FACTORS, ASSOCIATED SIGNS AND SYMPTOMS)
62yr old AA M, intermittently domiciled in [place] housing and living in shelters/streets, with a past  psych hx of depression, multiple past psych hospitalizations, recently in [facility], with hx of SA but banging head, swallowing metal, no substance use, hx of incarceration for manslaughter, was BIB EMS activated by self for suicidal ideation.     Patient reports that he's been feeling suicidal for months secondary to multiple housing stressors as well as social isolation. He states that 6-7 months ago he received [place] housing but they did not provide curtains for him so living there he was feeling paranoid and ultimately left the apartment as he felt people were shooting lasers and lights into his home. Since leaving the apartment he has been sleeping on the streets/[place] and trains and admits to worsening SI. He states that today he took one of his knives and put it to his wrist but ultimately decided to call 911 instead to have him brought to a hospital for help. He sates that he's been wanting to kill himself in any way he can including getting hit by a car, jumping in front of a train or cutting his wrists. He states multiple times throughout the interview that "he doesn't want to live anymore" and admits to "hearing voices" of a voice telling him to kill himself and saying "do it".     Patient continues to report paranoia when in the apartment but denies any paranoia in hospital. Pt is linear, logical, and organized. Patient does not report nor exhibit any signs of denis, including irritable or elevated mood, grandiosity, pressured speech, risk-taking behaviors, increase in productivity or agitation. Patient admits to sometimes harboring aggressive and violent thoughts towards those he feels were attacking him while in the apartment but denies any gestures or intent to hurt anyone else.     Collateral from SW in  NOTE.

## 2022-12-14 NOTE — ED BEHAVIORAL HEALTH ASSESSMENT NOTE - SUMMARY
62yr old AA M, intermittently domiciled in [facility] housing and living in shelters/streets, with a past  psych hx of depression, multiple past psych hospitalizations, recently in [facility], with hx of SA but banging head, swallowing metal, no substance use, hx of incarceration for manslaughter, was BIB EMS activated by self for suicidal ideation.   Pt presenting with suicidal ideation with multiple plans of high lethality in the context med non-adherence, social isolation and trouble with housing. He has been feeling hopeless, depressed and reports to hearing [facility] to kill himself. Pt at this time would benefit from psychiatric hospitalization for stabilization. 62yr old AA M, intermittently domiciled in [facility] housing and living in shelters/streets, with a past  psych hx of depression, multiple past psych hospitalizations, recently in [facility], with hx of SA but banging head, swallowing metal, no substance use, hx of incarceration for manslaughter, was BIB EMS activated by self for suicidal ideation.   Pt presenting with suicidal ideation with multiple plans of high lethality in the context med non-adherence, social isolation and trouble with housing. He has been feeling hopeless, depressed and reports to hearing [facility] to kill himself. Pt at this time would benefit from psychiatric hospitalization for stabilization.    Pt had attempted to hang himself with a blanket in the bathroom. CTA of head and neck performed.   PENDING RESULTS.     If patient medically cleared - can proceed with psychiatric admission on 9.27.

## 2022-12-14 NOTE — ED ADULT NURSE REASSESSMENT NOTE - NS ED NURSE REASSESS COMMENT FT1
PT sleeping in stretcher in front of nursing station, NAD, resp equal and unlabored. CO in place, ED tech observing pt at all times. Side rails up. No threat to self at this time.

## 2022-12-14 NOTE — ED BEHAVIORAL HEALTH ASSESSMENT NOTE - REFERRED BY
Self O-T Advancement Flap Text: The defect edges were debeveled with a #15 scalpel blade.  Given the location of the defect, shape of the defect and the proximity to free margins an O-T advancement flap was deemed most appropriate.  Using a sterile surgical marker, an appropriate advancement flap was drawn incorporating the defect and placing the expected incisions within the relaxed skin tension lines where possible.    The area thus outlined was incised deep to adipose tissue with a #15 scalpel blade.  The skin margins were undermined to an appropriate distance in all directions utilizing iris scissors.

## 2022-12-14 NOTE — ED PROVIDER NOTE - PROGRESS NOTE DETAILS
JASON Mart- approximately 1915 pt was observed in the bathroom with blanket tied around his neck and attempted to hung himself.  alerted staff member and provider. I was able to untie the blanket. Upon physical assessment no tracheal deviation, no bruising, no swelling noted, no stridor, no chest discomfort, no drooling, but complaining discomfort while swallowing. cta neck ordered, medication  and 1 to 1 for safety. NP Bereczky- PO challenge- passed, no drooling, not clearing throat, no chocking, coughing,  cta shows derbis and possible aspiration, chest  xray ordered- still pending medical clearance, upon clearance possible pt will transfer to [facility] for mental health stabilization. Domenico WHITE: Pt signed out to me.  CXR with no e/o infiltrate or aspiration.  He is tolerating PO without any coughing, drooling, or dysphagia.  He has no resp distress, sob, stridor, voice changes.  Pt is medically cleared for psychiatric admission, possible transfer to [facility].

## 2022-12-14 NOTE — ED BEHAVIORAL HEALTH ASSESSMENT NOTE - RISK ASSESSMENT
elevated risk of harm at this time given active SI, depression, no social support, hx of impulsivity, hx of incarceration, homelessness, and hx of SA.   Pt would benefit from psych hospitalization for stabilization.
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
## 2022-12-14 NOTE — ED BEHAVIORAL HEALTH NOTE - BEHAVIORAL HEALTH NOTE
Writer to fax over legals to [facility] ([phone], fax # [phone]). Writer to fax over legals and EKG to [facility] ([phone], fax # [phone]).    Writer contacted [facility] and spoke with GREGORIO Rollins. Writer informed staff that everything was in the charts. RN reports that the attending will review it and call back with an update.

## 2022-12-14 NOTE — ED BEHAVIORAL HEALTH NOTE - BEHAVIORAL HEALTH NOTE
Worker shola searched patient and [facility] worker called  Corina Weir [phone].  worker then called Phone: [phone] and spoke  to Joy Denise and clinical coordinator saurav. Ms. Denise states that the patient has only been at the shelter for a short time 11/6-11/30 and checked himself out. She states that the patient’s assigned shelter is [facility] assigned through [employer]. She states that the patient is able to come back but tonight there are no beds. Patient has to wait until a bed becomes available to come and this depends on who leaves. She states that the staff at the shelter does not have much information because the patient has not been there long. She states that the patient was previously at supportive housing through [facility] and is familiar with [facility]. She states that their shelter is located in the [place]. worker called [facility] michelle  [phone] and left a message for call back.  ems run sheet also printed. Worker shola searched patient and [facility] worker called  Corina Weir [phone].  worker then called Phone: [phone] and spoke  to Joyraven Denise and clinical coordinator saurav. Ms. Denise states that the patient has only been at the shelter for a short time 11/6-11/30 and checked himself out. She states that the patient’s assigned shelter is [facility] assigned through [employer]. She states that the patient is able to come back but tonight there are no beds. Patient has to wait until a bed becomes available to come and this depends on who leaves. She states that the staff at the shelter does not have much information because the patient has not been there long. She states that the patient was previously at supportive housing through [facility] and is familiar with [facility]. She states that their shelter is located in the [place]. worker called [facility]  [phone] and left a message for call back.  ems run sheet also printed.    writer called [facility] Phone: [phone] and left messages for call back. Worker shola searched patient and patient’s shelter is [facility] (Single Adult, Mental Health). worker called  Corina Weir [phone].  worker then called Phone: [phone] and spoke  to Joy Denise and clinical coordinator saurav. Ms. Denise states that the patient has only been at the shelter for a short time 11/6-11/30 and checked himself out. She states that the patient’s assigned shelter is [facility] assigned through [employer]. She states that the patient is able to come back but tonight there are no beds. Patient has to wait until a bed becomes available to come and this depends on who leaves. She states that the staff at the shelter does not have much information because the patient has not been there long. She states that the patient was previously at supportive housing through [facility] and is familiar with [facility]. She states that their shelter is located in the [place]. worker called [facility]  [phone] and left a message for call back.  ems run sheet also printed.    writer called [facility] Phone: [phone] and left messages for call back.  worker called [facility] Phone: [phone]/ Phone: [phone] and spoke to Nereyda who states that patient is not in their data base.  worker then called judi Phone: [phone] and spoke to katherine who reports that patient was prescribed zoloft 100 mg by dr. fu at [facility] 2 days ago. Patient was also prescribed Asprin 81 mg  in June but no other medication. Worker shola searched patient and [facility]Single Adult, Mental Health). worker called  Corina Weir [phone].  worker then called Phone: [phone] and spoke  to Joy Denise and clinical coordinator saurav. Ms. Denise states that the patient has only been at the shelter for a short time 11/6-11/30 and checked himself out. She states that the patient’s assigned shelter is [facility] assigned through [employer]. She states that the patient is able to come back but tonight there are no beds. Patient has to wait until a bed becomes available to come and this depends on who leaves. She states that the staff at the shelter does not have much information because the patient has not been there long. She states that the patient was previously at supportive housing through [facility] and is familiar with [facility]. She states that their shelter is located in the [place]. worker called [facility]  [phone] and left a message for call back.  ems run sheet also printed.    writer called [facility] Phone: [phone] and left messages for call back.  worker called [facility] Phone: [phone]/ Phone: [phone] and spoke to Nereyda who states that patient is not in their data base.  worker then called judi Phone: [phone] and spoke to katherine who reports that patient was prescribed zoloft 100 mg by dr. fu at [facility] 2 days ago. Patient was also prescribed Asprin 81 mg  in June but no other medication. worker called [phone] and number is not accepting calls at this time.

## 2022-12-14 NOTE — ED ADULT NURSE REASSESSMENT NOTE - NS ED NURSE REASSESS COMMENT FT1
At approximately 1920pm pt went in to  bathroom with sheet around his neck and attempted to hang himself by tying it around the fire alarm box. Security noticed pt's attempt to wrap sheet around box and intervened immediately and notified staff. Panic alarm activated. Sheet was removed from pt's neck and pt was medicated for safety and moved in front of nursing station and place on 1:1 observation. Pt respirations even and unlabored. No injuries noted to neck. Provider to order CT scan. Will monitor.

## 2022-12-14 NOTE — ED ADULT TRIAGE NOTE - BP NONINVASIVE SYSTOLIC (MM HG)
"Anesthesia Post Evaluation    Patient: Ty Lovell    Procedure(s) Performed: * No procedures listed *    Final Anesthesia Type: general  Patient location during evaluation: PACU  Patient participation: Yes- Able to Participate  Level of consciousness: awake and alert  Post-procedure vital signs: reviewed and stable  Pain management: adequate  Airway patency: patent  PONV status at discharge: No PONV  Anesthetic complications: no      Cardiovascular status: blood pressure returned to baseline and hemodynamically stable  Respiratory status: unassisted  Hydration status: euvolemic  Follow-up not needed.        Visit Vitals  /89 (BP Location: Right arm, Patient Position: Sitting)   Pulse 95   Temp 37.2 °C (98.9 °F) (Oral)   Resp 18   Ht 5' 7" (1.702 m)   Wt 81.8 kg (180 lb 5.4 oz)   LMP  (LMP Unknown)   SpO2 100%   Breastfeeding? Unknown   BMI 28.24 kg/m²       Pain/Myles Score: Pain Rating Prior to Med Admin: 0 (8/25/2018  1:05 PM)  Pain Rating Post Med Admin: 0 (8/25/2018  6:13 AM)        " 141

## 2022-12-14 NOTE — ED BEHAVIORAL HEALTH ASSESSMENT NOTE - NSBHATTESTBILLING_PSY_A_CORE
[id number]-Tpfvcmcfcau diagnostic evaluation with medical services [id number]-Emergency department visit - high complexity

## 2022-12-14 NOTE — ED PROVIDER NOTE - OBJECTIVE STATEMENT
psychiatrist - [phone] This is a 62-year-old male past medical history depression, with complaint of increased anxiety, commanding AH and suicidal ideation. Pt expressing lots of stress, he is going through some financial issues,  and most likely will become homeless, losing his residency.  Reports currently under treatment with Dr. Epstein [phone]. Endorses in the last week patient visited [facility] multiple times,  he stayed over in the [facility] for 3 days and after was cleared psychiatrically and discharged. He reports returned to the hospital on Sunday was discharged on Monday. Today with increased auditory hallucinations, the voices are telling him to either cut his wrist, or jump in front of the train.

## 2022-12-14 NOTE — ED BEHAVIORAL HEALTH ASSESSMENT NOTE - PSYCHIATRIC ISSUES AND PLAN (INCLUDE STANDING AND PRN MEDICATION)
c/w Zoloft 100mg po daily - prns: Haldol 5mg po/im q6hrs, ativan 2mg po/im q6hrs c/w Zoloft 100mg po daily - defer antipsychotic choice to in-patient team; prns: Haldol 5mg po/im q6hrs, ativan 2mg po/im q6hrs

## 2022-12-14 NOTE — ED ADULT NURSE REASSESSMENT NOTE - NS ED NURSE REASSESS COMMENT FT1
PT Alert and responsive.  PT tolerated well PO challenge.  No wet cough noted.  NP aware.  Will continue to monitor.

## 2022-12-14 NOTE — ED BEHAVIORAL HEALTH ASSESSMENT NOTE - OTHER PAST PSYCHIATRIC HISTORY (INCLUDE DETAILS REGARDING ONSET, COURSE OF ILLNESS, INPATIENT/OUTPATIENT TREATMENT)
hx of depression  multiple hospitalizations - last at [facility] a week ago was in the [facility]   Currently in treatment with [facility] with Jaycee Caban

## 2022-12-14 NOTE — ED ADULT NURSE NOTE - OBJECTIVE STATEMENT
Pt received into . AAOx4, c/o having suicidal thoughts with a plan to jump in front of a train. Pt states he is homeless and left the shelter he was staying in. Pt denies a/v hallucinations. Pt calm and cooperative at this time. Needs are met, safety maintained. Labs drawn and sent. Awaiting evaluation from medical/psych physician. no acute distress.
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
## 2022-12-14 NOTE — ED PROVIDER NOTE - CLINICAL SUMMARY MEDICAL DECISION MAKING FREE TEXT BOX
This is a 62-year-old male past medical history depression, with complaint of increased anxiety, commanding AH and suicidal ideation. Pt expressing lots of stress, he is going through some financial issues,  and most likely will become homeless, losing his residency.  Reports currently under treatment with Dr. Epstein [phone]. Endorses in the last week patient visited [facility] multiple times,  he stayed over in the [facility] for 3 days and after was cleared psychiatrically and discharged. He reports returned to the hospital on Sunday was discharged on Monday. Today with increased auditory hallucinations, the voices are telling him to either cut his wrist, or jump in front of the train.

## 2022-12-14 NOTE — ED BEHAVIORAL HEALTH NOTE - BEHAVIORAL HEALTH NOTE
COVID Exposure Screen- Patient  1.	*Have you had a COVID-19 test in the last 90 days?  (  ) Yes   ( x ) No   (  ) Unknown- Reason: _____  IF YES PROCEED TO QUESTION #2. IF NO OR UNKNOWN, PLEASE SKIP TO QUESTION #3.  2.	Date of test(s) and result(s): ________  3.	*Have you tested positive for COVID-19 antibodies? (  ) Yes   (x) No   (  ) Unknown- Reason: _____  IF YES PROCEED TO QUESTION #4. IF NO or UNKNOWN, PLEASE SKIP TO QUESTION #5.  4.	Date of positive antibody test: ________  5.	*Have you received 2 doses of the COVID-19 vaccine? (  x) Yes   (  ) No   (  ) Unknown- Reason: _____   IF YES PROCEED TO QUESTION #6. IF NO or UNKNOWN, PLEASE SKIP TO QUESTION #7.  6.	Date of second dose: __> 1 month ago______  7.	*In the past 10 days, have you been around anyone with a positive COVID-19 test?* (  ) Yes   (x  ) No   (  ) Unknown- Reason: ____  IF YES PROCEED TO QUESTION #8. IF NO or UNKNOWN, PLEASE SKIP TO QUESTION #13.  8.	Were you within 6 feet of them for at least 15 minutes? (  ) Yes   (  ) No   (  ) Unknown- Reason: _____  9.	Have you provided care for them? (  ) Yes   (  ) No   (  ) Unknown- Reason: ______  10.	Have you had direct physical contact with them (touched, hugged, or kissed them)? (  ) Yes   (  ) No    (  ) Unknown- Reason: _____  11.	Have you shared eating or drinking utensils with them? (  ) Yes   (  ) No    (  ) Unknown- Reason: ____  12.	Have they sneezed, coughed, or somehow gotten respiratory droplets on you? (  ) Yes   (  ) No    (  ) Unknown- Reason: ______  13.	*Have you been out of [place] within the past 10 days?* (  ) Yes   ( x ) No   (  ) Unknown- Reason: _____  IF YES PLEASE ANSWER THE FOLLOWING QUESTIONS:  14.	Which state/country have you been to? ______  15.	Were you there over 24 hours? (  ) Yes   (  ) No    (  ) Unknown- Reason: ______  16.	Date of return to [facility]: ______

## 2022-12-15 VITALS
RESPIRATION RATE: 16 BRPM | DIASTOLIC BLOOD PRESSURE: 65 MMHG | HEART RATE: 64 BPM | TEMPERATURE: 98 F | SYSTOLIC BLOOD PRESSURE: 108 MMHG | OXYGEN SATURATION: 100 %

## 2022-12-15 LAB
APPEARANCE UR: CLEAR — SIGNIFICANT CHANGE UP
BILIRUB UR-MCNC: NEGATIVE — SIGNIFICANT CHANGE UP
COLOR SPEC: YELLOW — SIGNIFICANT CHANGE UP
COVID-19 SPIKE DOMAIN AB INTERP: POSITIVE
COVID-19 SPIKE DOMAIN ANTIBODY RESULT: 26.4 U/ML — HIGH
DIFF PNL FLD: NEGATIVE — SIGNIFICANT CHANGE UP
GLUCOSE UR QL: NEGATIVE — SIGNIFICANT CHANGE UP
KETONES UR-MCNC: NEGATIVE — SIGNIFICANT CHANGE UP
LEUKOCYTE ESTERASE UR-ACNC: NEGATIVE — SIGNIFICANT CHANGE UP
NITRITE UR-MCNC: NEGATIVE — SIGNIFICANT CHANGE UP
PH UR: 6.5 — SIGNIFICANT CHANGE UP (ref 5–8)
PROT UR-MCNC: ABNORMAL
SARS-COV-2 IGG+IGM SERPL QL IA: 26.4 U/ML — HIGH
SARS-COV-2 IGG+IGM SERPL QL IA: POSITIVE
SP GR SPEC: >1.05 (ref 1.01–1.05)
UROBILINOGEN FLD QL: ABNORMAL

## 2022-12-15 PROCEDURE — 71046 X-RAY EXAM CHEST 2 VIEWS: CPT | Mod: 26

## 2022-12-15 NOTE — ED ADULT NURSE REASSESSMENT NOTE - NS ED NURSE REASSESS COMMENT FT1
Received pt from night shift currently sleeping calmly in front of  nursing station. Respirations even, unlabored, not in any physical distress. 1:1 observation and pt safety measures maintained. Will continue monitoring.

## 2022-12-15 NOTE — ED BEHAVIORAL HEALTH PROGRESS NOTE - ADDITIONAL DETAILS/COMMENTS FREE TEXT
Discussed medicating patient prior to transfer due to symptoms and transporting in 4 point restraint

## 2022-12-15 NOTE — ED BEHAVIORAL HEALTH NOTE - BEHAVIORAL HEALTH NOTE
Spoke with Stephen at [facility]-pt's clinicals are being reviewed by MD for possible transfer. Spoke with Stephen at [facility]-pt's clinicals are being reviewed by MD for possible transfer.    Addendum:  X-Ray results faxed to [facility]; urine was collected; results pending.

## 2022-12-15 NOTE — ED ADULT NURSE REASSESSMENT NOTE - NS ED NURSE REASSESS COMMENT FT1
Pt resting in stretcher, NAD, resp equal and unlabored. Offers no complaints at this time. 1 to 1 remains at bedside for observation. Safety obtained.

## 2022-12-15 NOTE — ED ADULT NURSE REASSESSMENT NOTE - NS ED NURSE REASSESS COMMENT FT1
Pt sleeping prior to vital signs. Calm and cooperative with Vital signs. NAD, resp equal and unlabored. PT offers no complaints. 1 to 1 remains at bedside, safety maintained.

## 2022-12-15 NOTE — ED BEHAVIORAL HEALTH PROGRESS NOTE - DETAILS:
Met with patient this AM. He was laying in bed comfortably on 1:1 observation. He endorses not doing well stating he has continued suicidal ideation due to CAH to kill himself. He appeared fatigued- stated he did sleep after medication last night. He denied VH/paranoia. He endorsed feeling depressed related to suicidal ideation and CAH.

## 2022-12-15 NOTE — ED BEHAVIORAL HEALTH PROGRESS NOTE - PSYCHIATRIC ISSUES AND PLAN (INCLUDE STANDING AND PRN MEDICATION)
c/w Zoloft 100mg po daily - prns: Haldol 5mg po/im q6hrs, ativan 2mg po/im q6hrs, 1:1 c/w Zoloft 100mg po daily; defer standing antipsychotic medications to inpatient team - prns: Haldol 5mg po/im q6hrs, ativan 2mg po/im q6hrs, 1:1

## 2022-12-15 NOTE — ED BEHAVIORAL HEALTH PROGRESS NOTE - NSBHATTESTCOMMENTATTENDFT_PSY_A_CORE
62/M with hx of depression/psychosis, multiple past psych hospitalizations, and hx of SA as well as SIB, no illicit substance use.  presented to the ED BIB EMS activated by self due to SI.     at this time, is severely affectively dysregulated and remains unpredictable.  Pt has SI with multiple plans of high lethality.  He has been depressed, is feeling hopeless, and reports to experiencing CAH (to kill himself).  current symptoms in the context med non-adherence complicated by multiple psychosocial stressors.  Pt is unable to partake towards safety planning. ongoing symptoms have caused severe functional impairment. he will need in-Pt psych admission aimed at stabilization and ensuring safety

## 2022-12-15 NOTE — ED BEHAVIORAL HEALTH PROGRESS NOTE - SUMMARY
62yr old AA M, intermittently domiciled in [facility] housing and living in shelters/streets, with a past  psych hx of depression, multiple past psych hospitalizations, recently in [facility], with hx of SA but banging head, swallowing metal, no substance use, hx of incarceration for manslaughter, was BIB EMS activated by self for suicidal ideation.   Pt presenting with suicidal ideation with multiple plans of high lethality in the context med non-adherence, social isolation and trouble with housing. He has been feeling hopeless, depressed and reports to hearing [facility] to kill himself. Pt at this time would benefit from psychiatric hospitalization for stabilization.    Pt had attempted to hang himself with a blanket in the bathroom. CTA of head and neck performed.   PENDING RESULTS.     If patient medically cleared - can proceed with psychiatric admission on 9.27.

## 2022-12-15 NOTE — ED ADULT NURSE REASSESSMENT NOTE - NS ED NURSE REASSESS COMMENT FT1
Pt left -ed at 930am with [facility] ems in 4-point restraints for safety and transfer to [facility] for psychiatric admission . MD Francisco signed electronic order for ems 4 point restraint. Pt is currently calm, not in any physical distress. Vitals signs stable. [facility] in 4-point restraints for safety and transfer to [facility] for psychiatric admission . MD Francisco signed electronic order for ems 4 point restraint. Pt is currently calm, not in any physical distress. Vitals signs stable. Discharge huddle completed with social work, nursing, and ED psychiatry.

## 2022-12-15 NOTE — ED ADULT NURSE REASSESSMENT NOTE - NS ED NURSE REASSESS COMMENT FT1
Pt brought to X-ray by self and tech. PT in NAD, calm a and cooperative. Pt brought back to , water given to pt as requested and pt used restroom with supervision. Pt resting in stretcher in front of nursing station, tech at bedside for observation. Safety maintained.

## 2022-12-15 NOTE — ED BEHAVIORAL HEALTH PROGRESS NOTE - CASE SUMMARY/FORMULATION (CLEARLY DOCUMENT RATIONALE FOR DISPOSITION CHANGE)
62yr old AA M, intermittently domiciled in [facility] housing and living in shelters/streets, with a past  psych hx of depression, multiple past psych hospitalizations, recently in [facility], with hx of SA but banging head, swallowing metal, no substance use, hx of incarceration for manslaughter, was BIB EMS activated by self for suicidal ideation.     Pt presenting with suicidal ideation with multiple plans of high lethality in the context med non-adherence, social isolation and trouble with housing. He has been feeling hopeless, depressed and reports to hearing [facility] to kill himself. He required IM medication and 1:1 observation in ED due to suicidal behaviors. Pt at this time would benefit from psychiatric hospitalization for stabilization.

## 2023-11-02 ENCOUNTER — INPATIENT (INPATIENT)
Facility: HOSPITAL | Age: 63
LOS: 13 days | Discharge: HOME | End: 2023-11-16
Attending: STUDENT IN AN ORGANIZED HEALTH CARE EDUCATION/TRAINING PROGRAM | Admitting: PSYCHIATRY & NEUROLOGY
Payer: MEDICAID

## 2023-11-02 VITALS
OXYGEN SATURATION: 100 % | DIASTOLIC BLOOD PRESSURE: 94 MMHG | SYSTOLIC BLOOD PRESSURE: 138 MMHG | TEMPERATURE: 99 F | HEART RATE: 88 BPM | RESPIRATION RATE: 18 BRPM

## 2023-11-02 DIAGNOSIS — F20.0 PARANOID SCHIZOPHRENIA: ICD-10-CM

## 2023-11-02 LAB
ALBUMIN SERPL ELPH-MCNC: 4.3 G/DL — SIGNIFICANT CHANGE UP (ref 3.3–5)
ALBUMIN SERPL ELPH-MCNC: 4.3 G/DL — SIGNIFICANT CHANGE UP (ref 3.3–5)
ALP SERPL-CCNC: 107 U/L — SIGNIFICANT CHANGE UP (ref 40–120)
ALP SERPL-CCNC: 107 U/L — SIGNIFICANT CHANGE UP (ref 40–120)
ALT FLD-CCNC: 15 U/L — SIGNIFICANT CHANGE UP (ref 4–41)
ALT FLD-CCNC: 15 U/L — SIGNIFICANT CHANGE UP (ref 4–41)
AMPHET UR-MCNC: NEGATIVE — SIGNIFICANT CHANGE UP
AMPHET UR-MCNC: NEGATIVE — SIGNIFICANT CHANGE UP
ANION GAP SERPL CALC-SCNC: 11 MMOL/L — SIGNIFICANT CHANGE UP (ref 7–14)
ANION GAP SERPL CALC-SCNC: 11 MMOL/L — SIGNIFICANT CHANGE UP (ref 7–14)
APAP SERPL-MCNC: <10 UG/ML — LOW (ref 15–25)
APAP SERPL-MCNC: <10 UG/ML — LOW (ref 15–25)
APPEARANCE UR: CLEAR — SIGNIFICANT CHANGE UP
APPEARANCE UR: CLEAR — SIGNIFICANT CHANGE UP
AST SERPL-CCNC: 19 U/L — SIGNIFICANT CHANGE UP (ref 4–40)
AST SERPL-CCNC: 19 U/L — SIGNIFICANT CHANGE UP (ref 4–40)
BARBITURATES UR SCN-MCNC: NEGATIVE — SIGNIFICANT CHANGE UP
BARBITURATES UR SCN-MCNC: NEGATIVE — SIGNIFICANT CHANGE UP
BASOPHILS # BLD AUTO: 0.05 K/UL — SIGNIFICANT CHANGE UP (ref 0–0.2)
BASOPHILS # BLD AUTO: 0.05 K/UL — SIGNIFICANT CHANGE UP (ref 0–0.2)
BASOPHILS NFR BLD AUTO: 0.7 % — SIGNIFICANT CHANGE UP (ref 0–2)
BASOPHILS NFR BLD AUTO: 0.7 % — SIGNIFICANT CHANGE UP (ref 0–2)
BENZODIAZ UR-MCNC: NEGATIVE — SIGNIFICANT CHANGE UP
BENZODIAZ UR-MCNC: NEGATIVE — SIGNIFICANT CHANGE UP
BILIRUB SERPL-MCNC: 0.4 MG/DL — SIGNIFICANT CHANGE UP (ref 0.2–1.2)
BILIRUB SERPL-MCNC: 0.4 MG/DL — SIGNIFICANT CHANGE UP (ref 0.2–1.2)
BILIRUB UR-MCNC: NEGATIVE — SIGNIFICANT CHANGE UP
BILIRUB UR-MCNC: NEGATIVE — SIGNIFICANT CHANGE UP
BUN SERPL-MCNC: 17 MG/DL — SIGNIFICANT CHANGE UP (ref 7–23)
BUN SERPL-MCNC: 17 MG/DL — SIGNIFICANT CHANGE UP (ref 7–23)
CALCIUM SERPL-MCNC: 9.1 MG/DL — SIGNIFICANT CHANGE UP (ref 8.4–10.5)
CALCIUM SERPL-MCNC: 9.1 MG/DL — SIGNIFICANT CHANGE UP (ref 8.4–10.5)
CHLORIDE SERPL-SCNC: 104 MMOL/L — SIGNIFICANT CHANGE UP (ref 98–107)
CHLORIDE SERPL-SCNC: 104 MMOL/L — SIGNIFICANT CHANGE UP (ref 98–107)
CO2 SERPL-SCNC: 24 MMOL/L — SIGNIFICANT CHANGE UP (ref 22–31)
CO2 SERPL-SCNC: 24 MMOL/L — SIGNIFICANT CHANGE UP (ref 22–31)
COCAINE METAB.OTHER UR-MCNC: POSITIVE
COCAINE METAB.OTHER UR-MCNC: POSITIVE
COLOR SPEC: YELLOW — SIGNIFICANT CHANGE UP
COLOR SPEC: YELLOW — SIGNIFICANT CHANGE UP
CREAT SERPL-MCNC: 0.97 MG/DL — SIGNIFICANT CHANGE UP (ref 0.5–1.3)
CREAT SERPL-MCNC: 0.97 MG/DL — SIGNIFICANT CHANGE UP (ref 0.5–1.3)
CREATININE URINE RESULT, DAU: 149 MG/DL — SIGNIFICANT CHANGE UP
CREATININE URINE RESULT, DAU: 149 MG/DL — SIGNIFICANT CHANGE UP
DIFF PNL FLD: NEGATIVE — SIGNIFICANT CHANGE UP
DIFF PNL FLD: NEGATIVE — SIGNIFICANT CHANGE UP
EGFR: 89 ML/MIN/1.73M2 — SIGNIFICANT CHANGE UP
EGFR: 89 ML/MIN/1.73M2 — SIGNIFICANT CHANGE UP
EOSINOPHIL # BLD AUTO: 0.21 K/UL — SIGNIFICANT CHANGE UP (ref 0–0.5)
EOSINOPHIL # BLD AUTO: 0.21 K/UL — SIGNIFICANT CHANGE UP (ref 0–0.5)
EOSINOPHIL NFR BLD AUTO: 3 % — SIGNIFICANT CHANGE UP (ref 0–6)
EOSINOPHIL NFR BLD AUTO: 3 % — SIGNIFICANT CHANGE UP (ref 0–6)
ETHANOL SERPL-MCNC: <10 MG/DL — SIGNIFICANT CHANGE UP
ETHANOL SERPL-MCNC: <10 MG/DL — SIGNIFICANT CHANGE UP
GLUCOSE SERPL-MCNC: 107 MG/DL — HIGH (ref 70–99)
GLUCOSE SERPL-MCNC: 107 MG/DL — HIGH (ref 70–99)
GLUCOSE UR QL: NEGATIVE MG/DL — SIGNIFICANT CHANGE UP
GLUCOSE UR QL: NEGATIVE MG/DL — SIGNIFICANT CHANGE UP
HCT VFR BLD CALC: 40.1 % — SIGNIFICANT CHANGE UP (ref 39–50)
HCT VFR BLD CALC: 40.1 % — SIGNIFICANT CHANGE UP (ref 39–50)
HGB BLD-MCNC: 12.5 G/DL — LOW (ref 13–17)
HGB BLD-MCNC: 12.5 G/DL — LOW (ref 13–17)
IANC: 4.08 K/UL — SIGNIFICANT CHANGE UP (ref 1.8–7.4)
IANC: 4.08 K/UL — SIGNIFICANT CHANGE UP (ref 1.8–7.4)
IMM GRANULOCYTES NFR BLD AUTO: 0.1 % — SIGNIFICANT CHANGE UP (ref 0–0.9)
IMM GRANULOCYTES NFR BLD AUTO: 0.1 % — SIGNIFICANT CHANGE UP (ref 0–0.9)
KETONES UR-MCNC: ABNORMAL MG/DL
KETONES UR-MCNC: ABNORMAL MG/DL
LEUKOCYTE ESTERASE UR-ACNC: NEGATIVE — SIGNIFICANT CHANGE UP
LEUKOCYTE ESTERASE UR-ACNC: NEGATIVE — SIGNIFICANT CHANGE UP
LYMPHOCYTES # BLD AUTO: 2.15 K/UL — SIGNIFICANT CHANGE UP (ref 1–3.3)
LYMPHOCYTES # BLD AUTO: 2.15 K/UL — SIGNIFICANT CHANGE UP (ref 1–3.3)
LYMPHOCYTES # BLD AUTO: 30.5 % — SIGNIFICANT CHANGE UP (ref 13–44)
LYMPHOCYTES # BLD AUTO: 30.5 % — SIGNIFICANT CHANGE UP (ref 13–44)
MCHC RBC-ENTMCNC: 23.3 PG — LOW (ref 27–34)
MCHC RBC-ENTMCNC: 23.3 PG — LOW (ref 27–34)
MCHC RBC-ENTMCNC: 31.2 GM/DL — LOW (ref 32–36)
MCHC RBC-ENTMCNC: 31.2 GM/DL — LOW (ref 32–36)
MCV RBC AUTO: 74.7 FL — LOW (ref 80–100)
MCV RBC AUTO: 74.7 FL — LOW (ref 80–100)
METHADONE UR-MCNC: NEGATIVE — SIGNIFICANT CHANGE UP
METHADONE UR-MCNC: NEGATIVE — SIGNIFICANT CHANGE UP
MONOCYTES # BLD AUTO: 0.55 K/UL — SIGNIFICANT CHANGE UP (ref 0–0.9)
MONOCYTES # BLD AUTO: 0.55 K/UL — SIGNIFICANT CHANGE UP (ref 0–0.9)
MONOCYTES NFR BLD AUTO: 7.8 % — SIGNIFICANT CHANGE UP (ref 2–14)
MONOCYTES NFR BLD AUTO: 7.8 % — SIGNIFICANT CHANGE UP (ref 2–14)
NEUTROPHILS # BLD AUTO: 4.08 K/UL — SIGNIFICANT CHANGE UP (ref 1.8–7.4)
NEUTROPHILS # BLD AUTO: 4.08 K/UL — SIGNIFICANT CHANGE UP (ref 1.8–7.4)
NEUTROPHILS NFR BLD AUTO: 57.9 % — SIGNIFICANT CHANGE UP (ref 43–77)
NEUTROPHILS NFR BLD AUTO: 57.9 % — SIGNIFICANT CHANGE UP (ref 43–77)
NITRITE UR-MCNC: NEGATIVE — SIGNIFICANT CHANGE UP
NITRITE UR-MCNC: NEGATIVE — SIGNIFICANT CHANGE UP
NRBC # BLD: 0 /100 WBCS — SIGNIFICANT CHANGE UP (ref 0–0)
NRBC # BLD: 0 /100 WBCS — SIGNIFICANT CHANGE UP (ref 0–0)
NRBC # FLD: 0 K/UL — SIGNIFICANT CHANGE UP (ref 0–0)
NRBC # FLD: 0 K/UL — SIGNIFICANT CHANGE UP (ref 0–0)
OPIATES UR-MCNC: NEGATIVE — SIGNIFICANT CHANGE UP
OPIATES UR-MCNC: NEGATIVE — SIGNIFICANT CHANGE UP
OXYCODONE UR-MCNC: NEGATIVE — SIGNIFICANT CHANGE UP
OXYCODONE UR-MCNC: NEGATIVE — SIGNIFICANT CHANGE UP
PCP SPEC-MCNC: SIGNIFICANT CHANGE UP
PCP SPEC-MCNC: SIGNIFICANT CHANGE UP
PCP UR-MCNC: NEGATIVE — SIGNIFICANT CHANGE UP
PCP UR-MCNC: NEGATIVE — SIGNIFICANT CHANGE UP
PH UR: 6 — SIGNIFICANT CHANGE UP (ref 5–8)
PH UR: 6 — SIGNIFICANT CHANGE UP (ref 5–8)
PLATELET # BLD AUTO: 177 K/UL — SIGNIFICANT CHANGE UP (ref 150–400)
PLATELET # BLD AUTO: 177 K/UL — SIGNIFICANT CHANGE UP (ref 150–400)
POTASSIUM SERPL-MCNC: 3.7 MMOL/L — SIGNIFICANT CHANGE UP (ref 3.5–5.3)
POTASSIUM SERPL-MCNC: 3.7 MMOL/L — SIGNIFICANT CHANGE UP (ref 3.5–5.3)
POTASSIUM SERPL-SCNC: 3.7 MMOL/L — SIGNIFICANT CHANGE UP (ref 3.5–5.3)
POTASSIUM SERPL-SCNC: 3.7 MMOL/L — SIGNIFICANT CHANGE UP (ref 3.5–5.3)
PROT SERPL-MCNC: 6.9 G/DL — SIGNIFICANT CHANGE UP (ref 6–8.3)
PROT SERPL-MCNC: 6.9 G/DL — SIGNIFICANT CHANGE UP (ref 6–8.3)
PROT UR-MCNC: NEGATIVE MG/DL — SIGNIFICANT CHANGE UP
PROT UR-MCNC: NEGATIVE MG/DL — SIGNIFICANT CHANGE UP
RBC # BLD: 5.37 M/UL — SIGNIFICANT CHANGE UP (ref 4.2–5.8)
RBC # BLD: 5.37 M/UL — SIGNIFICANT CHANGE UP (ref 4.2–5.8)
RBC # FLD: 14.9 % — HIGH (ref 10.3–14.5)
RBC # FLD: 14.9 % — HIGH (ref 10.3–14.5)
SALICYLATES SERPL-MCNC: <0.3 MG/DL — LOW (ref 15–30)
SALICYLATES SERPL-MCNC: <0.3 MG/DL — LOW (ref 15–30)
SARS-COV-2 RNA SPEC QL NAA+PROBE: SIGNIFICANT CHANGE UP
SARS-COV-2 RNA SPEC QL NAA+PROBE: SIGNIFICANT CHANGE UP
SODIUM SERPL-SCNC: 139 MMOL/L — SIGNIFICANT CHANGE UP (ref 135–145)
SODIUM SERPL-SCNC: 139 MMOL/L — SIGNIFICANT CHANGE UP (ref 135–145)
SP GR SPEC: 1.03 — SIGNIFICANT CHANGE UP (ref 1–1.03)
SP GR SPEC: 1.03 — SIGNIFICANT CHANGE UP (ref 1–1.03)
THC UR QL: POSITIVE
THC UR QL: POSITIVE
TOXICOLOGY SCREEN, DRUGS OF ABUSE, SERUM RESULT: SIGNIFICANT CHANGE UP
TOXICOLOGY SCREEN, DRUGS OF ABUSE, SERUM RESULT: SIGNIFICANT CHANGE UP
TSH SERPL-MCNC: 0.88 UIU/ML — SIGNIFICANT CHANGE UP (ref 0.27–4.2)
TSH SERPL-MCNC: 0.88 UIU/ML — SIGNIFICANT CHANGE UP (ref 0.27–4.2)
UROBILINOGEN FLD QL: 1 MG/DL — SIGNIFICANT CHANGE UP (ref 0.2–1)
UROBILINOGEN FLD QL: 1 MG/DL — SIGNIFICANT CHANGE UP (ref 0.2–1)
WBC # BLD: 7.05 K/UL — SIGNIFICANT CHANGE UP (ref 3.8–10.5)
WBC # BLD: 7.05 K/UL — SIGNIFICANT CHANGE UP (ref 3.8–10.5)
WBC # FLD AUTO: 7.05 K/UL — SIGNIFICANT CHANGE UP (ref 3.8–10.5)
WBC # FLD AUTO: 7.05 K/UL — SIGNIFICANT CHANGE UP (ref 3.8–10.5)

## 2023-11-02 PROCEDURE — 99285 EMERGENCY DEPT VISIT HI MDM: CPT | Mod: GC

## 2023-11-02 PROCEDURE — 99285 EMERGENCY DEPT VISIT HI MDM: CPT

## 2023-11-02 RX ORDER — HALOPERIDOL DECANOATE 100 MG/ML
5 INJECTION INTRAMUSCULAR EVERY 4 HOURS
Refills: 0 | Status: DISCONTINUED | OUTPATIENT
Start: 2023-11-02 | End: 2023-11-16

## 2023-11-02 RX ORDER — DIPHENHYDRAMINE HCL 50 MG
50 CAPSULE ORAL EVERY 4 HOURS
Refills: 0 | Status: DISCONTINUED | OUTPATIENT
Start: 2023-11-02 | End: 2023-11-16

## 2023-11-02 RX ORDER — INFLUENZA VIRUS VACCINE 15; 15; 15; 15 UG/.5ML; UG/.5ML; UG/.5ML; UG/.5ML
0.5 SUSPENSION INTRAMUSCULAR ONCE
Refills: 0 | Status: COMPLETED | OUTPATIENT
Start: 2023-11-02 | End: 2023-11-02

## 2023-11-02 RX ORDER — NICOTINE POLACRILEX 2 MG
2 GUM BUCCAL EVERY 4 HOURS
Refills: 0 | Status: DISCONTINUED | OUTPATIENT
Start: 2023-11-02 | End: 2023-11-05

## 2023-11-02 RX ORDER — DIPHENHYDRAMINE HCL 50 MG
50 CAPSULE ORAL ONCE
Refills: 0 | Status: DISCONTINUED | OUTPATIENT
Start: 2023-11-02 | End: 2023-11-16

## 2023-11-02 RX ORDER — HALOPERIDOL DECANOATE 100 MG/ML
5 INJECTION INTRAMUSCULAR ONCE
Refills: 0 | Status: DISCONTINUED | OUTPATIENT
Start: 2023-11-02 | End: 2023-11-16

## 2023-11-02 RX ORDER — NICOTINE POLACRILEX 2 MG
1 GUM BUCCAL DAILY
Refills: 0 | Status: DISCONTINUED | OUTPATIENT
Start: 2023-11-02 | End: 2023-11-16

## 2023-11-02 RX ADMIN — Medication 2 MILLIGRAM(S): at 22:23

## 2023-11-02 RX ADMIN — Medication 50 MILLIGRAM(S): at 22:26

## 2023-11-02 NOTE — BH PATIENT PROFILE - FALL HARM RISK - UNIVERSAL INTERVENTIONS
Bed in lowest position, wheels locked, appropriate side rails in place/Call bell, personal items and telephone in reach/Instruct patient to call for assistance before getting out of bed or chair/Non-slip footwear when patient is out of bed/[place] to call system/Physically safe environment - no spills, clutter or unnecessary equipment/Purposeful Proactive Rounding/Room/bathroom lighting operational, light cord in reach

## 2023-11-02 NOTE — ED PROVIDER NOTE - PSYCHIATRIC RISK
VERBALIZING WISH TO HARM SELF/VERBALIZING WISH TO HARM OTHERS/VERBALIZING SUICIDAL IDEATIONS/VERBALIZING HOMICIDAL IDEATIONS

## 2023-11-02 NOTE — ED BEHAVIORAL HEALTH NOTE - BEHAVIORAL HEALTH NOTE
SAFE Act completed  Submitted On: 11/2/2023 7:34:22 PM  Reference Number: [id number]  By: Yulia Chen  For: Juan Roy

## 2023-11-02 NOTE — ED BEHAVIORAL HEALTH ASSESSMENT NOTE - DETAILS
reports family members seeing psychiatrists but denies any diagnoses see HPI Reports hx of multiple suicide attempts, most recent in 12/22 where patient called EMS after having thoughts to jump in front of train. Denies active intent or plan at this time left message for  GAIL Burroughs
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
## 2023-11-02 NOTE — ED BEHAVIORAL HEALTH ASSESSMENT NOTE - ADDITIONAL DETAILS ALL
Reports hx of multiple suicide attempts, most recent in 12/22 where patient called EMS after having thoughts to jump in front of train. Denies active intent or plan at this time
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
## 2023-11-02 NOTE — ED BEHAVIORAL HEALTH ASSESSMENT NOTE - RISK ASSESSMENT
Chronic risk factors include hx of suicide attempts, hx of inpatient hospitalization, hx of psychotic disorder, male  Acute risk factors include psychosis, suicidal and homicidal thoughts, lack of outpatient treatment  Protective factors include lack of access to means, lack of intent or plan to act on suicidal thoughts, residential stability, familial support

## 2023-11-02 NOTE — ED BEHAVIORAL HEALTH ASSESSMENT NOTE - OTHER PAST PSYCHIATRIC HISTORY (INCLUDE DETAILS REGARDING ONSET, COURSE OF ILLNESS, INPATIENT/OUTPATIENT TREATMENT)
Multiple past psychiatric hospitalizations (most recently at [facility] in 12/22)  Multiple past suicide attempts (most recently in 12/22, reports aborted attempt where he was going to jump on train tracks and called EMS)  No current outpatient treatment
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
## 2023-11-02 NOTE — ED BEHAVIORAL HEALTH ASSESSMENT NOTE - NSBHPSYCHOLCOGORIENT_PSY_A_CORE
ED Hand/Wrist Injury





- General


Chief Complaint: Wrist Injury


Stated Complaint: WRIST INJURY


Time Seen by Provider: 08/26/17 04:41


TRAVEL OUTSIDE OF THE [place] IN LAST 30 DAYS: No





- HPI


Notes: 





12-year-old male presents with left wrist and hand pain.  He fell on an 

outstretched hand trying to jump over the metal railing at [facility] shortly 

prior to arrival that holds in the carts.  He complains of pain mostly in the 

radial side of the left hand but also a little bit at the left wrist and 

distally.  No numbness or tingling.  Does describe decreased range of motion 

secondary to pain.  He is right-hand dominant.  He has suffered no other prior 

injury to this area of significance in the past.





- Related Data


Allergies/Adverse Reactions: 


 





No Known Allergies Allergy (Unverified 08/26/17 04:31)


 











Past Medical History





- Social History


Lives with: Family


Family History: Reviewed & Not Pertinent


Patient has suicidal ideation: No


Patient has homicidal ideation: No


Pulmonary Medical History: Reports: Hx Asthma


Renal/ Medical History: Denies: Hx Peritoneal Dialysis





Review of Systems





- Review of Systems


Notes: 





Patient denies any other injury.  No head injury, loss of consciousness, other 

extremity pain, chest abdominal neck or back discomfort.  No recent illness.  

No bleeding or laceration.





Physical Exam





- Vital signs


Vitals: 


 











Temp Pulse Resp BP Pulse Ox


 


 97.8 F   90   20   109/81   100 


 


 08/26/17 04:24  08/26/17 04:24  08/26/17 04:24  08/26/17 04:24  08/26/17 04:24











Interpretation: Normal





- Notes


Notes: 








PHYSICAL EXAMINATION:





GENERAL: VS as per nursing doc. Well-appearing, well-nourished no acute 

distress.





HEAD: Atraumatic, normocephalic.





EYES: Anicteric without conjunctival injection.





ENT: Normal to inspection, moist mucous membranes.





NECK: Supple with grossly normal range of motion.





LUNGS: Normal respiratory excursion without distress.





HEART: Cap refill < 3 seconds.





ABDOMEN: Normal to inspection.





EXTREMITIES: No edema.  There is tenderness over the distal ulna as well as 

diffusely over the hand more so over the first and second metacarpals.  There 

is no distal radius tenderness but there is some mild snuffbox tenderness.  He 

is able to move his fingers though he has decreased range of motion there as 

well as at the wrist.





NEUROLOGICAL: Neurovascularly intact distally.





PSYCH: Normal mood, normal affect.





SKIN: Warm, dry.











Course





- Re-evaluation


Re-evalutation: 





08/26/17 05:54


Patient was reexamined still has more diffuse hand pain than any specific spot.

  He does have some pain at the area of question.  I was more concerned about a 

proximal second metacarpal fracture and I did not note any specific third 

metacarpal fracture.  I discussed with the mother that it is unclear but very 

well could have an occult fracture so she has to have mandatory orthopedic 

follow-up.  She voices understanding and will call first thing on Monday to 

arrange for next week.  He will remain in a volar splint.  They will apply ice 

and elevate in the meanwhile.





- Vital Signs


Vital signs: 


 











Temp Pulse Resp BP Pulse Ox


 


 97.8 F   90   20   109/81   100 


 


 08/26/17 04:24  08/26/17 04:24  08/26/17 04:24  08/26/17 04:24  08/26/17 04:24














Discharge





- Discharge


Clinical Impression: 


 Hand fracture, left





Condition: Good


Additional Instructions: 


Apply ice and elevate hand to decrease any swelling.  Use the splint.  Call 

first thing on Monday to arrange follow-up.  Tylenol or ibuprofen for 

discomfort.


Referrals: 


ABEL STOCKTON MD [Primary Care Provider] - Follow up as needed


KY NOLASCO DO [ACTIVE STAFF] - 08/28/17 Oriented to time, place, person, situation

## 2023-11-02 NOTE — ED BEHAVIORAL HEALTH ASSESSMENT NOTE - DESCRIPTION
denies In ED, patient calm, cooperative, but intermittently becomes dysregulated when discussing paranoia but is able to be verbally redirected.    ICU Vital Signs Last 24 Hrs  T(C): 37.1 (02 Nov 2023 17:51), Max: 37.1 (02 Nov 2023 17:51)  T(F): 98.7 (02 Nov 2023 17:51), Max: 98.7 (02 Nov 2023 17:51)  HR: 88 (02 Nov 2023 17:51) (88 - 88)  BP: 138/94 (02 Nov 2023 17:51) (138/94 - 138/94)  RR: 18 (02 Nov 2023 17:51) (18 - 18)  SpO2: 100% (02 Nov 2023 17:51) (100% - 100%)    O2 Parameters below as of 02 Nov 2023 17:51  Patient On (Oxygen Delivery Method): room air see HPI

## 2023-11-02 NOTE — ED ADULT TRIAGE NOTE - CHIEF COMPLAINT QUOTE
Pt brought in by EMS from home complaining of SI denies plan and hearing voices. pt calm and cooperative.

## 2023-11-02 NOTE — ED BEHAVIORAL HEALTH NOTE - BEHAVIORAL HEALTH NOTE
Writer contacted pt’s  ([phone]) [place] to obtain collateral information. Writer left a vm requesting a return call.    Writer contacted pt’s sister Lana stevens ([phone]) to obtain collateral information. The following information is per the sister.    Patient is an 61 yo male, hx of menta illness (sister thinks depression), not working or studying, bib EMS.    Reason for ED visit:  Sister unaware pt was in the ed.    Symptoms/HX: Sister last saw the pt 2 weeks ago when they visited his sister in the hospital. She says pt presented well. She says pt has a hx of si and has no prior attempts. She says  its been a while in regards to suicidal ideation but does not know specifically when. She says when he is not well, he is usually “bugging out” hears voices, becomes paranoid and will think people are watching him.  She suspects pt has been caring for himself in regards to sleep , hygiene and appetite.    Baseline: she says at baseline, he does not hear voices and is  not paranoid .    Stressors: unknown.    Treatment team: hx of hospitalizations w/ most recent last year she thinks. She does not know who pt is in treatment with.     Medical problems: no medical problems.    Medication:  unsure of medication.    Violence/ aggression: not violent or aggressive.    Drug/alcohol use: unknown.    Dispo: no further safety concerns reported.

## 2023-11-02 NOTE — ED BEHAVIORAL HEALTH ASSESSMENT NOTE - VIOLENCE RISK FACTORS:
Feeling of being under threat and being unable to control threat/Antisocial behavior/cognition (past or present)/Violent ideation/threat/speech/Substance abuse/Lack of insight into violence risk/need for treatment/Community stressors that increase the risk of destabilization/Irritability/History of violation of a legal mandate (e.g., parole, probation, AOT)
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
## 2023-11-02 NOTE — ED BEHAVIORAL HEALTH ASSESSMENT NOTE - NSBHATTESTCOMMENTATTENDFT_PSY_A_CORE
Patient is a 62 year old male, domiciled in [facility] ([place]) x 1 year, noncaregiver, no PMHx, past psychiatric hx of schizophrenia vs. schizoaffective disorder, multiple past psychiatric hospitalizations (last at [facility] in 12/22 in the context of suicidal ideation in response to cAH), multiple CPEP visits, hx of multiple suicide attempts (most recently in 12/22 after patient called EMS before jumping onto train tracks), hx of SIB via headbanging and swallowing metal, remote hx of substance use, hx of incarceration for manslaughter as per chart, BIBEMS activated by  after patient threatened to harm neighbors.   Pt reported AH and SI in the context of med non-adherence. Visibly disorganized, irritable and has not been functioning at his baseline. Pt to be admitted on 9.39.
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
## 2023-11-02 NOTE — ED PROVIDER NOTE - CPE EDP RESP NORM
RN addressed in separate encounter and routed to Dr. oCulter to review and advise upon return to clinic 7/26/22.    Closing this encounter to avoid duplication    normal...

## 2023-11-03 DIAGNOSIS — R45.850 HOMICIDAL IDEATIONS: ICD-10-CM

## 2023-11-03 DIAGNOSIS — F20.9 SCHIZOPHRENIA, UNSPECIFIED: ICD-10-CM

## 2023-11-03 DIAGNOSIS — R45.851 SUICIDAL IDEATIONS: ICD-10-CM

## 2023-11-03 PROCEDURE — 99222 1ST HOSP IP/OBS MODERATE 55: CPT | Mod: GC

## 2023-11-03 RX ORDER — RISPERIDONE 4 MG/1
1 TABLET ORAL AT BEDTIME
Refills: 0 | Status: DISCONTINUED | OUTPATIENT
Start: 2023-11-03 | End: 2023-11-04

## 2023-11-03 RX ADMIN — Medication 50 MILLIGRAM(S): at 20:40

## 2023-11-03 RX ADMIN — Medication 2 MILLIGRAM(S): at 20:41

## 2023-11-03 RX ADMIN — Medication 2 MILLIGRAM(S): at 20:43

## 2023-11-03 RX ADMIN — RISPERIDONE 1 MILLIGRAM(S): 4 TABLET ORAL at 20:40

## 2023-11-03 NOTE — BH INPATIENT PSYCHIATRY ASSESSMENT NOTE - CURRENT MEDICATION
MEDICATIONS  (STANDING):  influenza   Vaccine 0.5 milliLiter(s) IntraMuscular once    MEDICATIONS  (PRN):  diphenhydrAMINE 50 milliGRAM(s) Oral every 4 hours PRN agitation / eps ppx  diphenhydrAMINE Injectable 50 milliGRAM(s) IntraMuscular once PRN severe agitation / eps ppx  haloperidol     Tablet 5 milliGRAM(s) Oral every 4 hours PRN agitation  haloperidol    Injectable 5 milliGRAM(s) IntraMuscular Once PRN severe agitation  LORazepam     Tablet 2 milliGRAM(s) Oral every 4 hours PRN Agitation  LORazepam   Injectable 2 milliGRAM(s) IntraMuscular Once PRN severe agitation  nicotine  Polacrilex Gum 2 milliGRAM(s) Oral every 4 hours PRN Nicotine craving  nicotine - 21 mG/24Hr(s) Patch 1 Patch Transdermal daily PRN nrt   MEDICATIONS  (STANDING):  influenza   Vaccine 0.5 milliLiter(s) IntraMuscular once  risperiDONE   Tablet 1 milliGRAM(s) Oral at bedtime    MEDICATIONS  (PRN):  diphenhydrAMINE 50 milliGRAM(s) Oral every 4 hours PRN agitation / eps ppx  diphenhydrAMINE Injectable 50 milliGRAM(s) IntraMuscular once PRN severe agitation / eps ppx  haloperidol     Tablet 5 milliGRAM(s) Oral every 4 hours PRN agitation  haloperidol    Injectable 5 milliGRAM(s) IntraMuscular Once PRN severe agitation  LORazepam     Tablet 2 milliGRAM(s) Oral every 4 hours PRN Agitation  LORazepam   Injectable 2 milliGRAM(s) IntraMuscular Once PRN severe agitation  nicotine  Polacrilex Gum 2 milliGRAM(s) Oral every 4 hours PRN Nicotine craving  nicotine - 21 mG/24Hr(s) Patch 1 Patch Transdermal daily PRN nrt

## 2023-11-03 NOTE — BH INPATIENT PSYCHIATRY ASSESSMENT NOTE - NSBHATTESTCOMMENTATTENDFT_PSY_A_CORE
Agree with above.  Interview is limited by patient's lack of cooperation.  He deflects most questions and terminates the interview when told he cannot be transferred to another unit.  Presents as paranoid.  Will start Risperdal with plan for ELAINE.

## 2023-11-03 NOTE — BH INPATIENT PSYCHIATRY ASSESSMENT NOTE - CURRENT PLAN
[Time Spent: ___ minutes] : I have spent [unfilled] minutes of face to face time with the patient [>50% of Time Spent on Counseling for ____] : Greater than 50% of the encounter time was spent on counseling for [unfilled] No
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
## 2023-11-03 NOTE — BH INPATIENT PSYCHIATRY ASSESSMENT NOTE - OTHER PAST PSYCHIATRIC HISTORY (INCLUDE DETAILS REGARDING ONSET, COURSE OF ILLNESS, INPATIENT/OUTPATIENT TREATMENT)
Per  ED Note:  Multiple past psychiatric hospitalizations (most recently at [facility] in 12/22)  Multiple past suicide attempts (most recently in 12/22, reports aborted attempt where he was going to jump on train tracks and called EMS)  No current outpatient treatment
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
## 2023-11-03 NOTE — BH INPATIENT PSYCHIATRY ASSESSMENT NOTE - NSBHCHARTREVIEWVS_PSY_A_CORE FT
Vital Signs Last 24 Hrs  T(C): 36.5 (11-03-23 @ 08:39), Max: 37.2 (11-02-23 @ 20:53)  T(F): 97.7 (11-03-23 @ 08:39), Max: 98.9 (11-02-23 @ 20:53)  HR: 69 (11-03-23 @ 08:39) (57 - 88)  BP: 125/75 (11-03-23 @ 08:39) (125/75 - 138/94)  BP(mean): --  RR: 18 (11-02-23 @ 20:53) (18 - 18)  SpO2: 100% (11-02-23 @ 20:53) (100% - 100%)    Orthostatic VS  11-02-23 @ 22:07  Lying BP: --/-- HR: --  Sitting BP: 144/78 HR: 82  Standing BP: 140/84 HR: 76  Site: --  Mode: --

## 2023-11-03 NOTE — BH INPATIENT PSYCHIATRY ASSESSMENT NOTE - ADDITIONAL DETAILS ALL
Per  ED Note: Reports hx of multiple suicide attempts, most recent in 12/22 where patient called EMS after having thoughts to jump in front of train. Denies active intent or plan at this time

## 2023-11-03 NOTE — PSYCHIATRIC REHAB INITIAL EVALUATION - NSBHPRRECOMMEND_PSY_ALL_CORE
Writer attempted to meet with patient in order to orient patient to unit, provide patient with a unit schedule, and introduce patient to psychiatric rehabilitation staff and department functions. Due to patient sleeping and unable to be roused, writer was unable to meet with patient in order to conduct a complete individual assessment. Therefore, the following information will be based off patient’s chart. Writer and patient were unable to establish a collaborative psychiatric rehabilitation goal, therefore one will be chosen for him. Psychiatric Rehabilitation staff will continue to engage patient daily in order to develop therapeutic rapport.

## 2023-11-03 NOTE — BH INPATIENT PSYCHIATRY ASSESSMENT NOTE - CURRENT ACTIVE IDEATION
Patient has not been feeling well for 3 weeks. Their symptoms are left big toe pain.   Vera Gaxiola LPN LPN....................  9/28/2019   3:58 PM     Yes

## 2023-11-03 NOTE — PSYCHIATRIC REHAB INITIAL EVALUATION - NSBHALCSUBTREAT_PSY_ALL_CORE
Pt receives outpatient treatment through [facility]. Pt has hx of medication non-compliance./Outpatient clinic (specify)
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
## 2023-11-03 NOTE — BH INPATIENT PSYCHIATRY ASSESSMENT NOTE - NSBHCHARTREVIEWLAB_PSY_A_CORE FT
12.5   7.05  )-----------( 177      ( 02 Nov 2023 19:00 )             40.1     11-02    139  |  104  |  17  ----------------------------<  107<H>  3.7   |  24  |  0.97    Ca    9.1      02 Nov 2023 19:00    TPro  6.9  /  Alb  4.3  /  TBili  0.4  /  DBili  x   /  AST  19  /  ALT  15  /  AlkPhos  107  11-02

## 2023-11-03 NOTE — BH INPATIENT PSYCHIATRY ASSESSMENT NOTE - NSBHMETABOLIC_PSY_ALL_CORE_FT
BMI: BMI (kg/m2): 19.9 (11-02-23 @ 22:07)  HbA1c:   Glucose:   BP: 125/75 (11-03-23 @ 08:39) (125/75 - 138/94)  Lipid Panel:

## 2023-11-03 NOTE — BH SOCIAL WORK INITIAL PSYCHOSOCIAL EVALUATION - OTHER PAST PSYCHIATRIC HISTORY (INCLUDE DETAILS REGARDING ONSET, COURSE OF ILLNESS, INPATIENT/OUTPATIENT TREATMENT)
Pt is a 63 yo, AAM living in [facility] program, with multiple hospitalizations, last one Dec 2022 at [facility]. Pt has a history of noncompliance, paranoia towards his neighbors , several suicide attempts and incarceration. Pt works in a retail store, young not attend appts for outpt tx.

## 2023-11-03 NOTE — BH INPATIENT PSYCHIATRY ASSESSMENT NOTE - NSDCCRITERIA_PSY_ALL_CORE
The patient may be discharged when they are no longer an acute or imminent risk of harm to self or others and are able to care for themselves safely.

## 2023-11-03 NOTE — BH INPATIENT PSYCHIATRY ASSESSMENT NOTE - MSE UNSTRUCTURED FT
This is a man wearing street clothes. He walked with a normal gait. He was poorly related to the interviewers, confrontational and superficial. He made fair eye contact, although intense at times. He frequently changed positions while sitting and made large gestures throughout interview. He spoke fluently in English at a normal rate, rhythm, and volume. His reported mood was "fine." His affect was irritable, overall stable, full. His thought process was linear with some illogicality. His thought content was notable for paranoia toward neighbors and staff. He denied current SIIP but endorsed recent suicidality with vague plan to jump in front of train (no preparatory acts, no concrete date). He denied current HIIP but intimated that he would harm those that harm him. There was no evidence or endorsement of perceptual disturbances. Insight is limited. Judgment is poor. Impulse control was intact but tenuous.  This is a man wearing street clothes. He walked with a normal gait. He was poorly related to the interviewers, confrontational and superficial. He made fair eye contact, although intense at times. He frequently changed positions while sitting and made large gestures throughout interview. He spoke fluently in English at a normal rate, rhythm, and volume. His reported mood was "fine." His affect was irritable, overall stable, full. His thought process was linear with some illogicality and some loosening of associations. His thought content was notable for paranoia toward neighbors and staff. He denied current SIIP but endorsed recent suicidality with vague plan to jump in front of train (no preparatory acts, no concrete date). He denied current HIIP but intimated that he would harm those that harm him. There was no evidence or endorsement of perceptual disturbances. Insight is limited. Judgment is poor. Impulse control was intact but tenuous.

## 2023-11-03 NOTE — BH INPATIENT PSYCHIATRY ASSESSMENT NOTE - DETAILS
Endorsed recent suicidality with vague plan to jump in front of train    Per  ED Note: Reports hx of multiple suicide attempts, most recent in 12/22 where patient called EMS after having thoughts to jump in front of train.  Per  ED Note: reports family members seeing psychiatrists but denies any diagnoses see HPI

## 2023-11-04 PROCEDURE — 99232 SBSQ HOSP IP/OBS MODERATE 35: CPT

## 2023-11-04 RX ORDER — RISPERIDONE 4 MG/1
2 TABLET ORAL AT BEDTIME
Refills: 0 | Status: DISCONTINUED | OUTPATIENT
Start: 2023-11-04 | End: 2023-11-06

## 2023-11-04 RX ADMIN — Medication 2 MILLIGRAM(S): at 02:45

## 2023-11-04 RX ADMIN — Medication 2 MILLIGRAM(S): at 23:23

## 2023-11-04 RX ADMIN — Medication 1 PATCH: at 07:55

## 2023-11-04 RX ADMIN — Medication 2 MILLIGRAM(S): at 13:29

## 2023-11-04 RX ADMIN — RISPERIDONE 2 MILLIGRAM(S): 4 TABLET ORAL at 21:04

## 2023-11-04 RX ADMIN — Medication 50 MILLIGRAM(S): at 16:17

## 2023-11-04 RX ADMIN — Medication 2 MILLIGRAM(S): at 16:17

## 2023-11-04 RX ADMIN — Medication 2 MILLIGRAM(S): at 19:48

## 2023-11-04 RX ADMIN — Medication 2 MILLIGRAM(S): at 17:30
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
## 2023-11-04 NOTE — BH INPATIENT PSYCHIATRY PROGRESS NOTE - NSBHFUPINTERVALHXFT_PSY_A_CORE
Case discussed with nursing. As per RN - pt continues to be irritable, pacing around, and made threatening comments towards another patient however did not engage in any violence. yESTERDAY AT 840PM he received ativan 2mg and Benadryl 50mg. Accepted Risperdal 1mg po qhs.   He is hyperfixated on "going upstairs" because he does not like the vibes of the unit he is on. He continues to have AH but won't divulge what they are saying. As per staff he's been eating and sleeping fairly on the unit.   pt states he's been taking his medication and denying any side effects.

## 2023-11-04 NOTE — BH INPATIENT PSYCHIATRY PROGRESS NOTE - MSE UNSTRUCTURED FT
This is a man wearing street clothes. He walked with a normal gait. He was poorly related to the interviewers, confrontational and superficial. He made fair eye contact, although intense at times. He frequently changed positions while sitting and made large gestures throughout interview. He spoke fluently in English at a normal rate, rhythm, and volume. His reported mood was "not good." His affect was irritable, overall stable,. His thought process was linear with some illogicality and some loosening of associations. His thought content was notable for hyperfixation on changing units to "go upstairs". He denied current SIIP but endorsed recent suicidality with vague plan to jump in front of train (no preparatory acts, no concrete date). He denied current HIIP but intimated that he would harm those that harm him. There was no evidence or endorsement of perceptual disturbances. Insight is limited. Judgment is poor. Impulse control was intact but tenuous.

## 2023-11-05 PROCEDURE — 99232 SBSQ HOSP IP/OBS MODERATE 35: CPT

## 2023-11-05 RX ORDER — NICOTINE POLACRILEX 2 MG
4 GUM BUCCAL
Refills: 0 | Status: DISCONTINUED | OUTPATIENT
Start: 2023-11-05 | End: 2023-11-16

## 2023-11-05 RX ADMIN — RISPERIDONE 2 MILLIGRAM(S): 4 TABLET ORAL at 21:28

## 2023-11-05 RX ADMIN — Medication 2 MILLIGRAM(S): at 21:27

## 2023-11-05 RX ADMIN — Medication 4 MILLIGRAM(S): at 10:36

## 2023-11-05 RX ADMIN — Medication 2 MILLIGRAM(S): at 01:26

## 2023-11-05 RX ADMIN — Medication 4 MILLIGRAM(S): at 21:53

## 2023-11-05 RX ADMIN — Medication 4 MILLIGRAM(S): at 07:23

## 2023-11-05 RX ADMIN — Medication 1 PATCH: at 07:23

## 2023-11-05 RX ADMIN — Medication 50 MILLIGRAM(S): at 21:28

## 2023-11-05 NOTE — BH INPATIENT PSYCHIATRY PROGRESS NOTE - MSE UNSTRUCTURED FT
This is a man wearing hospital clothes. He walked with a normal gait. Less confrontational today. He made fair eye contact, although intense at times. He spoke fluently in English at a normal rate, rhythm, and volume. His reported mood was "better." His affect was irritable, but overall stable,. His thought process was linear with some illogicality and some loosening of associations. His thought content has been notable for hyperfixation on changing units to "go upstairs". He denied current SIIP but endorsed recent suicidality with vague plan to jump in front of train (no preparatory acts, no concrete date). He denied current HIIP but intimated that he would harm those that harm him. There was no evidence or endorsement of perceptual disturbances though patient reports recent AH (not command in nature). Insight is limited. Judgment is poor. Impulse control was intact but tenuous.

## 2023-11-05 NOTE — BH INPATIENT PSYCHIATRY PROGRESS NOTE - NSBHFUPINTERVALHXFT_PSY_A_CORE
Case discussed with nursing. As per RN - pt continues to be fixated on changing units.  yESTERDAY AT 1140AM he received ativan 2mg and Benadryl 50mg. Accepted risperdal 2mg po qhs.   This morning he reports to be doing better and feels well rested despite not sleeping well last night. He reports that he does not want to take more medication to help him sleep and feels what he's on now is sufficient. He was more pleasant than yesterday and more engagable in conversation and did not demand to change units. He denies any SI/I/P at this time. Admits to some AH but denies them to be Command in nature. He reports he cannot describe the AH at this time. Pt however does not appear to be responding to internal stimuli.
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
## 2023-11-06 PROCEDURE — 99231 SBSQ HOSP IP/OBS SF/LOW 25: CPT | Mod: GC

## 2023-11-06 RX ORDER — RISPERIDONE 4 MG/1
3 TABLET ORAL AT BEDTIME
Refills: 0 | Status: DISCONTINUED | OUTPATIENT
Start: 2023-11-06 | End: 2023-11-07

## 2023-11-06 RX ADMIN — Medication 4 MILLIGRAM(S): at 04:13

## 2023-11-06 RX ADMIN — Medication 4 MILLIGRAM(S): at 18:13

## 2023-11-06 RX ADMIN — Medication 4 MILLIGRAM(S): at 06:54

## 2023-11-06 RX ADMIN — RISPERIDONE 3 MILLIGRAM(S): 4 TABLET ORAL at 20:41

## 2023-11-06 RX ADMIN — Medication 1 PATCH: at 08:41

## 2023-11-06 RX ADMIN — Medication 4 MILLIGRAM(S): at 11:15

## 2023-11-06 RX ADMIN — Medication 1 PATCH: at 07:46

## 2023-11-06 RX ADMIN — Medication 1 PATCH: at 06:32

## 2023-11-06 NOTE — BH INPATIENT PSYCHIATRY PROGRESS NOTE - NSBHATTESTCOMMENTATTENDFT_PSY_A_CORE
Agree with above.  Improving with Risperdal in that delusions do not require urgent action any longer.  However, delusional conviction remains.  Will continue Risperdal titration.
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
## 2023-11-06 NOTE — BH INPATIENT PSYCHIATRY PROGRESS NOTE - MSE UNSTRUCTURED FT
This is a man wearing hospital clothes. He walked with a normal gait. Less confrontational today. He made fair eye contact, although intense at times. He spoke fluently in English at a normal rate, rhythm, and volume. His reported mood was "better." His affect was irritable, but overall stable,. His thought process was linear with some illogicality and some loosening of associations. His thought content has been notable for hyperfixation on changing units to "go upstairs". He denied current SIIP but endorsed recent suicidality with vague plan to jump in front of train (no preparatory acts, no concrete date). He denied current HIIP but intimated that he would harm those that harm him. There was no evidence or endorsement of perceptual disturbances though patient reports recent AH (not command in nature). Insight is limited. Judgment is poor. Impulse control was intact but tenuous.  This is a man wearing hospital clothes. He walked with a normal gait. He made fair eye contact, although odd at times. He spoke fluently in English at a normal rate, rhythm, and volume. His reported mood was "good." His affect was euthymic, stable, full. His thought process was linear with some illogicality and some loosening of associations. His thought content has been notable for hyperfixation on changing units. He denied current SIIP and HIIP. There was no evidence or endorsement of perceptual disturbances. Insight is limited. Judgment is poor. Impulse control was intact but tenuous.  This is a man wearing hospital clothes. He walked with a normal gait. He made fair eye contact, although odd at times. He spoke fluently in English at a normal rate, rhythm, and volume. His reported mood was "good." His affect was euthymic, stable, full. His thought process was linear with some illogicality. His thought content has been notable for hyperfixation on changing units. He denied current SIIP and HIIP. There was no evidence or endorsement of perceptual disturbances. Insight is limited. Judgment is poor. Impulse control was intact but tenuous.
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
## 2023-11-07 PROCEDURE — 99231 SBSQ HOSP IP/OBS SF/LOW 25: CPT

## 2023-11-07 RX ORDER — RISPERIDONE 4 MG/1
4 TABLET ORAL AT BEDTIME
Refills: 0 | Status: DISCONTINUED | OUTPATIENT
Start: 2023-11-07 | End: 2023-11-16

## 2023-11-07 RX ADMIN — RISPERIDONE 4 MILLIGRAM(S): 4 TABLET ORAL at 20:34

## 2023-11-07 RX ADMIN — Medication 1 PATCH: at 08:10

## 2023-11-07 RX ADMIN — Medication 4 MILLIGRAM(S): at 06:23

## 2023-11-07 RX ADMIN — Medication 4 MILLIGRAM(S): at 20:35
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
## 2023-11-07 NOTE — BH INPATIENT PSYCHIATRY PROGRESS NOTE - MSE UNSTRUCTURED FT
This is a man wearing hospital clothes. He walked with a normal gait. He made fair eye contact, although odd at times. He spoke fluently in English at a normal rate, rhythm, and volume. His reported mood was "good." His affect was euthymic, stable, full. His thought process was linear with some illogicality. Continues to have delusional thought content with full delusional conviction though evolving to be less intense. He denied current SIIP and HIIP. There was no evidence or endorsement of perceptual disturbances. Insight is limited. Judgment is fair. Impulse control was intact.
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
## 2023-11-08 DIAGNOSIS — F22 DELUSIONAL DISORDERS: ICD-10-CM

## 2023-11-08 PROCEDURE — 99232 SBSQ HOSP IP/OBS MODERATE 35: CPT | Mod: GC

## 2023-11-08 RX ADMIN — Medication 50 MILLIGRAM(S): at 16:41

## 2023-11-08 RX ADMIN — Medication 4 MILLIGRAM(S): at 22:25

## 2023-11-08 RX ADMIN — Medication 50 MILLIGRAM(S): at 20:41

## 2023-11-08 RX ADMIN — Medication 1 PATCH: at 08:39

## 2023-11-08 RX ADMIN — RISPERIDONE 4 MILLIGRAM(S): 4 TABLET ORAL at 20:35

## 2023-11-08 RX ADMIN — Medication 2 MILLIGRAM(S): at 16:41

## 2023-11-08 RX ADMIN — Medication 4 MILLIGRAM(S): at 17:59

## 2023-11-08 RX ADMIN — Medication 4 MILLIGRAM(S): at 11:59

## 2023-11-08 RX ADMIN — Medication 4 MILLIGRAM(S): at 14:48

## 2023-11-08 RX ADMIN — Medication 2 MILLIGRAM(S): at 20:42

## 2023-11-08 NOTE — BH INPATIENT PSYCHIATRY PROGRESS NOTE - MSE UNSTRUCTURED FT
This is a man wearing hospital clothes. He walked with a normal gait. He made fair eye contact. He was well related to the interviewers. He spoke fluently in English at a normal rate, rhythm, and volume. His reported mood was "better." His affect was euthymic, stable, full. His thought process was overall linear, overinclusive at times. Continues to have paranoid ideation but increasing in his ability to reality test. He denied current SIIP and HIIP. There was no evidence or endorsement of perceptual disturbances. Insight is improving. Judgment is fair. Impulse control was intact.

## 2023-11-08 NOTE — BH INPATIENT PSYCHIATRY PROGRESS NOTE - NSBHATTESTCOMMENTATTENDFT_PSY_A_CORE
Agree with above.  Continues to show signs of improvement in psychotic symptoms.  Extensive psychoeducation provided today along with CBT based interventions for psychosis--patient was able to engage well especially in reality testing.  Delusions remain, however.  Will continue at 4mg Risperdal dose.
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
## 2023-11-09 PROCEDURE — 99232 SBSQ HOSP IP/OBS MODERATE 35: CPT | Mod: GC

## 2023-11-09 RX ADMIN — Medication 1 PATCH: at 11:21

## 2023-11-09 RX ADMIN — RISPERIDONE 4 MILLIGRAM(S): 4 TABLET ORAL at 21:11

## 2023-11-09 RX ADMIN — Medication 1 PATCH: at 09:17

## 2023-11-09 RX ADMIN — Medication 4 MILLIGRAM(S): at 23:10

## 2023-11-09 RX ADMIN — Medication 4 MILLIGRAM(S): at 08:52

## 2023-11-09 RX ADMIN — Medication 1 PATCH: at 08:51

## 2023-11-09 RX ADMIN — Medication 4 MILLIGRAM(S): at 19:50

## 2023-11-09 NOTE — BH INPATIENT PSYCHIATRY PROGRESS NOTE - NSBHATTESTCOMMENTATTENDFT_PSY_A_CORE
Agree with above. Had verbal altercation with another patient yesterday.  Was not psychotically motivated.  Today is able to discuss in detail and is able to engage in analysis of what led to verbal altercation.  Will continue with Risperdal 4mg.  Discharge next week likely.

## 2023-11-09 NOTE — BH INPATIENT PSYCHIATRY PROGRESS NOTE - MSE UNSTRUCTURED FT
This is a man wearing hospital clothes. He walked with a normal gait. He made fair eye contact. He was well related to the interviewers. He spoke fluently in English at a normal rate, rhythm, and volume. His reported mood was "good." His affect was euthymic, stable, full. His thought process was overall linear, overinclusive at times. Continues to have paranoid ideation but increasing in his ability to reality test. He denied current SIIP and HIIP. There was no evidence or endorsement of perceptual disturbances. Insight is improving. Judgment is fair. Impulse control was intact.
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
## 2023-11-09 NOTE — BH INPATIENT PSYCHIATRY PROGRESS NOTE - NSBHFUPINTERVALHXFT_PSY_A_CORE
Chart reviewed. Case discussed with interdisciplinary team. No acute events overnight. Adherent to standing medications. Sleeping through the night per sleep log.    Patient remains calm and cooperative on exam. He continues to deny SIIP, HIIP, and AVH. Conversation today related to reality testing and distress tolerance. Patient accepting and engaging with psychoeducation and counseling. Overall reports feeling well.

## 2023-11-10 PROCEDURE — 99232 SBSQ HOSP IP/OBS MODERATE 35: CPT | Mod: GC

## 2023-11-10 PROCEDURE — 90832 PSYTX W PT 30 MINUTES: CPT

## 2023-11-10 RX ADMIN — Medication 1 PATCH: at 08:26

## 2023-11-10 RX ADMIN — Medication 4 MILLIGRAM(S): at 21:29

## 2023-11-10 RX ADMIN — Medication 4 MILLIGRAM(S): at 02:16

## 2023-11-10 RX ADMIN — Medication 4 MILLIGRAM(S): at 18:38

## 2023-11-10 RX ADMIN — Medication 4 MILLIGRAM(S): at 04:45

## 2023-11-10 NOTE — BH PSYCHOLOGY - CLINICIAN PSYCHOTHERAPY NOTE - TOKEN PULL-DIAGNOSIS
Primary Diagnosis:  Schizophrenia [F20.9]      Paranoia [F22]        Problem Dx:   Paranoia [F22]      Homicidal ideation [R45.850]      Suicidal ideation [R45.851]      Schizophrenia [F20.9]

## 2023-11-10 NOTE — BH TREATMENT PLAN - NSDCCRITERIA_PSY_ALL_CORE
The patient may be discharged when they are no longer an acute or imminent risk of harm to self or others and are able to care for themselves safely.
The patient may be discharged when they are no longer an acute or imminent risk of harm to self or others and are able to care for themselves safely.

## 2023-11-10 NOTE — BH TREATMENT PLAN - NSTXVIOLNTINTERMD_PSY_ALL_CORE
medication management; interdisciplinary behavioral planning
medication management; interdisciplinary behavioral planning

## 2023-11-10 NOTE — BH INPATIENT PSYCHIATRY PROGRESS NOTE - MSE UNSTRUCTURED FT
This is a man wearing hospital clothes. He walked with a normal gait. He made fair eye contact. He was fairly related to the interviewers. He spoke fluently in English at a normal rate, rhythm, and volume. His reported mood was "bad." His affect was frustrated and irritable, constricted, stable. His thought process was overall linear, overinclusive at times. He denied current SIIP and HIIP. He denied paranoia. There was no evidence or endorsement of perceptual disturbances. Insight is improving. Judgment is fair. Impulse control was intact.

## 2023-11-10 NOTE — BH TREATMENT PLAN - NSCMSPTSTRENGTHS_PSY_ALL_CORE
Intact employment/Motivated/Physically healthy/Self-reliant/Tolerant
Intact employment/Motivated/Physically healthy/Self-reliant/Tolerant

## 2023-11-10 NOTE — BH PSYCHOLOGY - CLINICIAN PSYCHOTHERAPY NOTE - NSBHPSYCHOLINT_PSY_A_CORE
Cognitive/behavioral therapy/Dialectical  Behavioral Therapy (DBT)/Dynamic issues addressed/Supported coping skills/Supportive therapy/other...

## 2023-11-10 NOTE — BH TREATMENT PLAN - NSPTSTATEDGOAL_PSY_ALL_CORE
[FreeTextEntry1] : Patient is a 21 year old native Nauruan speaking female, with a history of asthma, eosinophilic granulomatosis with polyangiitis (EGPA, formerly Churg-Alirio syndrome), EGPA-related myocarditis, asthma, cardiomyopathy with severe systolic dysfunction, and obesity. She is referred by Dr. Libby Multani for evaluation of an incidental found thymic mass.\par \par Patient reports in her usual health status. She denies fatigue, unintentional weigh gain or loss, facial/neck/arms swelling, hoarse voice, persist cough, chest pain,  or shortness of breath. She recalls a biopsy of a similar sounding mass in 2020 back home in [place], with unspecific finding. \par \par CCTA on 10/27/22\par -  Compared to prior CT chest ( 3/29/22) there was interval development of a bilobed anterior mediastinal density which appears to represent thymic tissue. \par \par CT chest without contrast on 03/29/22\par - No significant lung pathology. Cardiomegaly. 
to be transferred to another unit
to be transferred to another unit

## 2023-11-10 NOTE — BH INPATIENT PSYCHIATRY PROGRESS NOTE - NSBHATTESTCOMMENTATTENDFT_PSY_A_CORE
Agree with above.  Emotionally distressed and somewhat dysregulated today, endorsing SI related to unit milieu.  Calms down later in the day and is at peace with being on the unit.  Some increase in paranoia under stress with more concern about delusions at home.

## 2023-11-10 NOTE — BH TREATMENT PLAN - NSTXPSYCHOINTERRN_PSY_ALL_CORE
Assess pt's mood, behavior, and thought process. Educate pt on importance of adhering to treatment plan. Redirect and reorient pt as needed.

## 2023-11-10 NOTE — BH INPATIENT PSYCHIATRY PROGRESS NOTE - NSBHASSESSSUMMFT_PSY_ALL_CORE
This is a 61y/o man with no past medical history and a past psychiatric history of schizophrenia/schizoaffective disorder, multiple prior psychiatric hospitalizations (last [facility] Dec2022 for SI and CAH), and multiple prior SA (most recent Dec2022; via train), NSSIB (banging head; swallowing metal) admitted for SI (via train) and HI (toward neighbors) after EMS activated by  for threat of harm to neighbors.      Today patient with irritability secondary to concerns about milieu and peers. Although these concerns have paranoid valence, they are more attributable to complex trauma and personality organization than to psychotic illness. He has remained in good behavioral control despite concerns. Patient continues to exhibit cluster B personality traits within the valence of narcissistic and antisocial personality disorder. While he endorsed SI last evening, this is likely an emotional response to unit milieu. He denied plan or desire to act on thoughts. He denied HIIP and expressed desire to stay in control. His admitting presentation was significant for paranoia (now improving), likely in the context of decompensated schizophrenia. He is responding well to risperidone and declining transition to [facility]. He would benefit from individual therapy interventions.     Plan:  - admitted on 9.39  - routine observation  - encourage group and milieu therapy  - individual therapy  - continue risperidone 4mg PO qhs    PRNs:  - agitation ---> PO haloperidol 5mg, lorazepam 2mg, and diphenhydramine 50mg q4h  - severe agitation ---> IM haloperidol 5mg, lorazepam 2mg, and diphenhydramine 50mg q4h    Disposition:  - pending stabilization  - residence accepting of patient once stabilized

## 2023-11-10 NOTE — BH TREATMENT PLAN - NSTXMEDICINTERPR_PSY_ALL_CORE
Pt has demonstrated much progress towards psychiatric rehabilitation goals over the past week. Psychiatric Rehabilitation staff will continue to meet with pt individually to provide support, encouragement, and counseling so that they will continue identifying and utilizing effective coping skills for better symptom management.
Patient will benefit from demonstrating medication compliance and engaging in individual and group sessions in order to identify the benefits of compliance for improved symptom management by day seven. Psychiatric Rehabilitation staff will engage patient daily in order to assist patient in exploring various coping strategies for sustained recovery.

## 2023-11-10 NOTE — BH INPATIENT PSYCHIATRY PROGRESS NOTE - NSBHFUPINTERVALHXFT_PSY_A_CORE
Chart reviewed. Case discussed with interdisciplinary team. No acute events overnight. Adherent to standing medications.    Today patient is notably upset. Reports difficulties with the unit milieu and interactions with peers. Reporting difficulty sleeping and endorsing SI last night secondary to concerns of unit milieu. Insisting on need to be transferred to "get better care." Stated that he would defend himself if targeted. Unit expectations and boundaries reinforced. Interview terminated by patient leaving room.

## 2023-11-10 NOTE — BH TREATMENT PLAN - NSTXVIOLNTINTERRN_PSY_ALL_CORE
Assess pt's mood and behavior, encourage pt to utilize coping skills, redirect pt as needed, administer PRN medication if indicated.

## 2023-11-10 NOTE — BH TREATMENT PLAN - NSTXPATIENTPARTICIPATE_PSY_ALL_CORE
No, patient unwilling to participate
Patient participated in identification of needs/problems/goals for treatment/Patient participated in defining interventions/Patient participated in development of after care plan

## 2023-11-10 NOTE — BH TREATMENT PLAN - NSTXDCOPLKINTERSW_PSY_ALL_CORE
SW met with pt for support, met with team to assess dc status, and communciated with pts brother.
SW reviewed EMR, met with pt who gave verbal consent to speak with staff at [facility].

## 2023-11-10 NOTE — BH TREATMENT PLAN - NSTXPLANTHERAPYSESSIONSFT_PSY_ALL_CORE
11-09-23  Type of therapy: Coping skills, Creative arts therapy, Leisure development, Mindfulness, Music therapy, Peer advocate, Safety planning, Spirituality, Stress management, Symptom management  Type of session: Individual  Level of patient participation: Attentive, Engaged, Participates  Duration of participation: 30 minutes  Therapy conducted by: Psych rehab  Therapy Summary: Pt has demonstrated much progress towards psychiatric rehabilitation goals over the past week. Pt is able to identify talking to his , talking to family, cooking, working, exercising, laughing, and having coffee as healthy coping strategies to better manage symptoms. Pt endorses improvements in mood, sleep, appetite, and thought processing. Pt presents as less irritable and more receptive to treatment. Pt denies any current AH/VH or paranoia. Pt also denies any current SI/HI, intent, or plan. Writer and pt engaged in safety planning, which can be reviewed in the  Safety Plan document. Pt was able to identify various warning signs, coping skills, and social supports to utilize after discharge. Pt has been compliant with medications and is tolerating them well. Over the past week, pt has been increasingly receptive to skill development. Pt attended approximately 60% of daily psychiatric rehabilitation groups. Pt has not required any redirection in the group setting and is able to engage appropriately with encouragement. Pt has been visible in the milieu and demonstrates an increase in positive socialization with select peers. Pt has required some redirection in the context of interpersonal conflicts with peers. Pt is receptive to verbal redirection. Pt is able to verbalize thoughts, needs, and feelings to staff effectively. Pt demonstrates improving insight and judgment into symptoms and treatment. Writer will continue to encourage pt to engage in treatment in order to better develop coping skills and for continued support.

## 2023-11-10 NOTE — BH PSYCHOLOGY - CLINICIAN PSYCHOTHERAPY NOTE - NSBHPSYCHOLNARRATIVE_PSY_A_CORE FT
Pt appeared alert and presented with fair hygiene and grooming, dressed in his own clothes. Pt reported that he was feeling “frustrated and disappointed” related unit milieu, demonstrating full range of affect, making consistent eye contact. No perceptual disturbances were reported or observed. Thought process was tangential, overinclusive, and goal directed overall. Pts’ speech was WNL. Pt denied SI, plans, or intent during session, was future oriented. Pt did not endorse HI. Oriented x3. Fair insight and judgement demonstrated.     Writer met with Pt for a supportive therapy session, engaging Pt in mindfulness and grounding exercises, with good effect. Pt detailed his last experience at [facility], and how helpful the other patients and unit milieu were for him, reflecting on how different this was from his current hospitalization. Pt disclosed family trauma and how unsafe he often felt in his home environment growing up. Pt shared how impactful it is for others to recognize his growth and the efforts he puts forth to get better and help others. Writer and Pt discussed the role that florencia plays in his life, and that prayer helps him cope with overwhelming emotions. Writer and Pt explored ways of incorporating prayer/meditation during the remainder of his time in the hospital. Pt also identified additional social supports and his motivation to ask for help when overwhelmed/discouraged. Writer provided Pt with [facility] weekend wellness workbook, as well as support, validation, encouragement, and a safe space to share his thoughts and feelings.

## 2023-11-11 RX ADMIN — Medication 4 MILLIGRAM(S): at 11:55

## 2023-11-11 RX ADMIN — Medication 1 PATCH: at 07:47

## 2023-11-11 RX ADMIN — Medication 4 MILLIGRAM(S): at 22:45

## 2023-11-11 RX ADMIN — Medication 4 MILLIGRAM(S): at 00:26

## 2023-11-11 RX ADMIN — Medication 4 MILLIGRAM(S): at 16:20

## 2023-11-11 RX ADMIN — Medication 4 MILLIGRAM(S): at 07:49

## 2023-11-11 RX ADMIN — Medication 30 MILLILITER(S): at 00:26

## 2023-11-12 RX ADMIN — Medication 4 MILLIGRAM(S): at 18:06

## 2023-11-12 RX ADMIN — Medication 30 MILLILITER(S): at 01:00

## 2023-11-12 RX ADMIN — Medication 1 PATCH: at 08:00

## 2023-11-12 RX ADMIN — Medication 1 PATCH: at 08:05

## 2023-11-12 RX ADMIN — Medication 4 MILLIGRAM(S): at 07:40

## 2023-11-12 RX ADMIN — Medication 4 MILLIGRAM(S): at 03:55

## 2023-11-12 RX ADMIN — Medication 4 MILLIGRAM(S): at 12:47

## 2023-11-12 RX ADMIN — RISPERIDONE 4 MILLIGRAM(S): 4 TABLET ORAL at 20:58

## 2023-11-12 RX ADMIN — Medication 4 MILLIGRAM(S): at 20:58

## 2023-11-13 PROCEDURE — 99231 SBSQ HOSP IP/OBS SF/LOW 25: CPT | Mod: GC

## 2023-11-13 RX ADMIN — Medication 1 PATCH: at 07:59

## 2023-11-13 RX ADMIN — Medication 4 MILLIGRAM(S): at 13:18

## 2023-11-13 RX ADMIN — Medication 4 MILLIGRAM(S): at 19:44

## 2023-11-13 RX ADMIN — Medication 4 MILLIGRAM(S): at 08:01

## 2023-11-13 RX ADMIN — Medication 4 MILLIGRAM(S): at 22:09

## 2023-11-13 RX ADMIN — Medication 4 MILLIGRAM(S): at 16:12

## 2023-11-13 RX ADMIN — RISPERIDONE 4 MILLIGRAM(S): 4 TABLET ORAL at 20:30

## 2023-11-13 NOTE — BH INPATIENT PSYCHIATRY PROGRESS NOTE - MSE UNSTRUCTURED FT
This is a man wearing hospital clothes. He walked with a normal gait. He made fair eye contact. He was fairly related to the interviewers. He spoke fluently in English at a normal rate, rhythm, and volume. His reported mood was "better." His affect was neutral, stable, full. His thought process was overall linear, overinclusive at times. He denied current SIIP and HIIP. He denied paranoia. There was no evidence or endorsement of perceptual disturbances. Insight is improving. Judgment is fair. Impulse control was intact.

## 2023-11-13 NOTE — BH DISCHARGE NOTE NURSING/SOCIAL WORK/PSYCH REHAB - NSDCPRGOAL_PSY_ALL_CORE
Pt has demonstrated much progress towards psychiatric rehabilitation goals during the current hospitalization. Pt is able to identify asking someone for help, talking to family, cooking, going to work, exercising, laughing, and having coffee as healthy coping strategies to better manage symptoms. Pt endorses improvements in mood, sleep, appetite, and thought processing. Pt denies any current SI/HI, intent, or plan. Pt also denies any current AH/VH, or paranoia. Writer and pt engaged in safety planning, which can be reviewed in the  Safety Plan document. Pt was able to identify various warning signs, coping skills, and social supports to utilize after discharge. Pt has been compliant with medications and is tolerating them well. During the current hospitalization, pt was somewhat receptive to skill development. Pt attended approximately 40% of daily psychiatric rehabilitation groups. Pt was verbal in groups and participated appropriately. Pt was visible in the milieu, although demonstrated minimal socialization with select peers. Pt required some redirection due to interpersonal conflicts with peers, however, was pt was receptive to redirection. Pt is able to verbalize thoughts, needs, and feelings to staff with encouragement. Pt demonstrates improving insight and judgement into symptoms and treatment.

## 2023-11-13 NOTE — BH DISCHARGE NOTE NURSING/SOCIAL WORK/PSYCH REHAB - NSCDUDCCRISIS_PSY_A_CORE
[facility]  1 [phone] [facility]LL ([phone]  Text "WELL" to [phone]  Website: www.[employer]/.[facility] 1 (123) 621-EWDI (0970) Website: www.safehorizon.org/.National Suicide Prevention Lifeline 5 (580) 099-7014/.  [facility]  1 (693) LIFENET (006-9589)/.  [facility]  [phone] [place] [phone]   Hours:  Monday through Friday from 9 AM to 3 PM/988 Suicide and Crisis Lifeline

## 2023-11-13 NOTE — BH DISCHARGE NOTE NURSING/SOCIAL WORK/PSYCH REHAB - DISCHARGE INSTRUCTIONS AFTERCARE APPOINTMENTS
In order to check the location, date, or time of your aftercare appointment, please refer to your Discharge Instructions Document given to you upon leaving the hospital.  If you have lost the instructions please call [phone]

## 2023-11-13 NOTE — BH INPATIENT PSYCHIATRY PROGRESS NOTE - NSBHFUPINTERVALHXFT_PSY_A_CORE
Chart reviewed. Case discussed with interdisciplinary team. Patient had episodes of yelling at staff and peers over the weekend but did not require IM medications. He did not take risperidone [id number] and [id number] and reported worsening paranoia.    Patient alert and oriented. Today reports that his mood is not great but better because he slept well last night. Described events of the weekend when he yelled, stating that he apologized after the fact to all parties. He stated that he "wrote a suicide note" in his head on Saturday when he was having trouble sleeping but denies any thoughts of acting on suicidal thoughts in or out of the hospital. He denies continuation of SI after that one instance. Also denies HIIP. He explained that he did not want to take the night time medications because they were sedating him. He had been taking PRN lorazepam and diphenhydramine at night in addition to standing risperidone. Psychoeducation provided regarding these medications and specifically the rationale for prescribing risperidone to the patient (paranoia, disorganization). Psychoeducation provided regarding schizophrenia and its symptoms. He was able to reality test strange sensations in his feet that occurred over the weekend ("There was nothing happening and I felt it. Since it's not happening here, it can't be happening out there").

## 2023-11-13 NOTE — BH INPATIENT PSYCHIATRY PROGRESS NOTE - NSBHASSESSSUMMFT_PSY_ALL_CORE
This is a 63y/o man with no past medical history and a past psychiatric history of schizophrenia/schizoaffective disorder, multiple prior psychiatric hospitalizations (last [facility] Dec2022 for SI and CAH), and multiple prior SA (most recent Dec2022; via train), NSSIB (banging head; swallowing metal) admitted for SI (via train) and HI (toward neighbors) after EMS activated by  for threat of harm to neighbors.      Patient with mild worsening of symptoms over the weekend, in part due to self-discontinuation of risperidone. Counseling provided regarding rationale for treatment and need for adherence. He has moments of poor distress tolerance toward staff and peers, manifesting as yelling. Patient continues to exhibit cluster B personality traits within the valence of narcissistic and antisocial personality disorder. He again endorsed SI on 11Nov2023 but denied continuation of thoughts or plan or desire to act on thoughts. He denied HIIP. His admitting presentation was significant for paranoia (now improving), likely in the context of decompensated schizophrenia. He is overall responding well to risperidone but with non-adherence this weekend. Will continue alongside individual therapy.    Plan:  - admitted on 9.39  - routine observation  - encourage group and milieu therapy  - individual therapy  - continue risperidone 4mg PO qhs    PRNs:  - agitation ---> PO haloperidol 5mg, lorazepam 2mg, and diphenhydramine 50mg q4h  - severe agitation ---> IM haloperidol 5mg, lorazepam 2mg, and diphenhydramine 50mg q4h    Disposition:  - pending stabilization  - residence accepting of patient once stabilized

## 2023-11-13 NOTE — BH DISCHARGE NOTE NURSING/SOCIAL WORK/PSYCH REHAB - PATIENT PORTAL LINK FT
You can access the FollowMyHealth Patient Portal offered by [facility] by registering at the following website: [email or web address]. By joining [facility]’s FollowMyHealth portal, you will also be able to view your health information using other applications (apps) compatible with our system.

## 2023-11-13 NOTE — BH INPATIENT PSYCHIATRY PROGRESS NOTE - NSBHATTESTCOMMENTATTENDFT_PSY_A_CORE
Agree with above.  Patient refused Risperdal doses for 2 days this weekend and felt worse, did not sleep.  He took Risperdal last night and again slept well, is feeling better today.  Agrees to continue with Risperdal.

## 2023-11-14 PROCEDURE — 99233 SBSQ HOSP IP/OBS HIGH 50: CPT | Mod: GC

## 2023-11-14 RX ADMIN — Medication 1 PATCH: at 08:53

## 2023-11-14 RX ADMIN — Medication 4 MILLIGRAM(S): at 22:33

## 2023-11-14 RX ADMIN — Medication 4 MILLIGRAM(S): at 02:35

## 2023-11-14 RX ADMIN — Medication 1 PATCH: at 08:03

## 2023-11-14 RX ADMIN — RISPERIDONE 4 MILLIGRAM(S): 4 TABLET ORAL at 20:07

## 2023-11-14 RX ADMIN — Medication 4 MILLIGRAM(S): at 08:51

## 2023-11-14 NOTE — DIETITIAN INITIAL EVALUATION ADULT - ADD RECOMMEND
No pork per pt's request. Encourage adequate po intake as tolerated and honor food preferences PRN. Monitor PO intake/tolerance, weights, labs, BM's, and skin integrity.

## 2023-11-14 NOTE — DIETITIAN INITIAL EVALUATION ADULT - PERTINENT MEDS FT
MEDICATIONS  (STANDING):  influenza   Vaccine 0.5 milliLiter(s) IntraMuscular once  risperiDONE   Tablet 4 milliGRAM(s) Oral at bedtime    MEDICATIONS  (PRN):  diphenhydrAMINE 50 milliGRAM(s) Oral every 4 hours PRN agitation / eps ppx  diphenhydrAMINE Injectable 50 milliGRAM(s) IntraMuscular once PRN severe agitation / eps ppx  haloperidol     Tablet 5 milliGRAM(s) Oral every 4 hours PRN agitation  haloperidol    Injectable 5 milliGRAM(s) IntraMuscular Once PRN severe agitation  LORazepam     Tablet 2 milliGRAM(s) Oral every 4 hours PRN Agitation  LORazepam   Injectable 2 milliGRAM(s) IntraMuscular Once PRN severe agitation  nicotine  Polacrilex Gum 4 milliGRAM(s) Oral every 2 hours PRN smoking cessation  nicotine - 21 mG/24Hr(s) Patch 1 Patch Transdermal daily PRN nrt

## 2023-11-14 NOTE — BH INPATIENT PSYCHIATRY PROGRESS NOTE - NSBHANTIPSYCHOTIC_PSY_ALL_CORE_FT
Patient stated that he last saw his PCP in September with a benign assessment (notably A1c and BP).

## 2023-11-14 NOTE — DIETITIAN INITIAL EVALUATION ADULT - OTHER INFO
Patient is a 63 y/o male with no PMHx, with PPHx of schizophrenia/schizoaffective disorder, multiple prior psychiatric hospitalizations (last [facility] Dec 2022 for SI and CAH), and multiple prior SA (most recent Dec2022; via train), NSSIB (banging head; swallowing metal) admitted for SI (via train) and HI (toward neighbors) after EMS activated by  for threat of harm to neighbors.    Met with patient in the dining area today. Patient reports good appetite with good PO intake at meals, denies chewing/swallowing difficulties on current diet. NKFA reported. Dislikes pork, with multiple food preferences already implemented on Cboard -- reviewed and updated food preferences for patient today. Otherwise, patient states "everything is good...food is delicious here." Patient has no questions about his diet. Current wt: 166lb (11/11 standing), 155lb (11/2) --? wt accuracy, patient appears closer to 155lb; patient does not recall his UBW or wt status PTA. Will cont to follow wt trend.

## 2023-11-14 NOTE — BH INPATIENT PSYCHIATRY PROGRESS NOTE - NSBHATTESTCOMMENTATTENDFT_PSY_A_CORE
Agree with above.  Expressing good mood and appreciation for staff.  Tolerating Risperdal well.  Denies paranoid ideation.  Looking forward to discharge this week.   Agree with above.  Expressing good mood and appreciation for staff.  Tolerating Risperdal well.  Denies paranoid ideation.  Looking forward to discharge this week.  Labs tomorrow AM including CBC, CMP, prolactin.

## 2023-11-14 NOTE — BH INPATIENT PSYCHIATRY PROGRESS NOTE - NSBHASSESSSUMMFT_PSY_ALL_CORE
This is a 61y/o man with no past medical history and a past psychiatric history of schizophrenia/schizoaffective disorder, multiple prior psychiatric hospitalizations (last [facility] Dec2022 for SI and CAH), and multiple prior SA (most recent Dec2022; via train), NSSIB (banging head; swallowing metal) admitted for SI (via train) and HI (toward neighbors) after EMS activated by  for threat of harm to neighbors.    Today patient with elevated mood and rapid speech. At this time, he does not meet criteria for a manic/hypomanic episode but will require monitoring of symptoms. Patient continues to exhibit cluster B personality traits within the valence of narcissistic and antisocial personality disorder. He denied SIIP, HIIP, AVH, and paranoia. His admitting presentation was significant for paranoia (now improving), likely in the context of decompensated schizophrenia. He is overall responding well to risperidone.     Plan:  - admitted on 9.39  - routine observation  - encourage group and milieu therapy  - individual therapy  - continue risperidone 4mg PO qhs    PRNs:  - agitation ---> PO haloperidol 5mg, lorazepam 2mg, and diphenhydramine 50mg q4h  - severe agitation ---> IM haloperidol 5mg, lorazepam 2mg, and diphenhydramine 50mg q4h    Disposition:  - pending stabilization  - residence accepting of patient once stabilized

## 2023-11-14 NOTE — BH INPATIENT PSYCHIATRY PROGRESS NOTE - NSBHATTESTBILLING_PSY_A_CORE
97178-Qzimibnfcq OBS or IP - low complexity OR 25-34 mins 83539-Nxdjsoltuo OBS or IP - high complexity OR 50-79 mins

## 2023-11-14 NOTE — BH INPATIENT PSYCHIATRY PROGRESS NOTE - NSBHFUPINTERVALHXFT_PSY_A_CORE
Chart reviewed. Case discussed with interdisciplinary team. No acute events overnight. Adherent to standing risperidone.     Patient alert and oriented. Reports much improved mood today, secondary to improved sleep and discharge planning. Denying racing thoughts and increased activity. Denying SIIP, HIIP, and AVH. Denying paranoid ideation. Employing coping skills to handle distressing situations. Future oriented and hopeful.

## 2023-11-14 NOTE — BH INPATIENT PSYCHIATRY PROGRESS NOTE - MSE UNSTRUCTURED FT
This is a man wearing hospital clothes. He walked with a normal gait. He made fair eye contact. He was fairly related to the interviewers. He spoke fluently in English at a normal rhythm and volume but with increased rate. His reported mood was "great." His affect was elevated, stable, full. His thought process was overall linear but overinclusive. He denied SIIP and HIIP. He denied paranoia. There was no evidence or endorsement of perceptual disturbances. Insight is improving. Judgment is fair. Impulse control was intact.

## 2023-11-15 LAB
A1C WITH ESTIMATED AVERAGE GLUCOSE RESULT: 5.8 % — HIGH (ref 4–5.6)
A1C WITH ESTIMATED AVERAGE GLUCOSE RESULT: 5.8 % — HIGH (ref 4–5.6)
ALBUMIN SERPL ELPH-MCNC: 4.2 G/DL — SIGNIFICANT CHANGE UP (ref 3.3–5)
ALBUMIN SERPL ELPH-MCNC: 4.2 G/DL — SIGNIFICANT CHANGE UP (ref 3.3–5)
ALP SERPL-CCNC: 114 U/L — SIGNIFICANT CHANGE UP (ref 40–120)
ALP SERPL-CCNC: 114 U/L — SIGNIFICANT CHANGE UP (ref 40–120)
ALT FLD-CCNC: 26 U/L — SIGNIFICANT CHANGE UP (ref 4–41)
ALT FLD-CCNC: 26 U/L — SIGNIFICANT CHANGE UP (ref 4–41)
ANION GAP SERPL CALC-SCNC: 10 MMOL/L — SIGNIFICANT CHANGE UP (ref 7–14)
ANION GAP SERPL CALC-SCNC: 10 MMOL/L — SIGNIFICANT CHANGE UP (ref 7–14)
AST SERPL-CCNC: 16 U/L — SIGNIFICANT CHANGE UP (ref 4–40)
AST SERPL-CCNC: 16 U/L — SIGNIFICANT CHANGE UP (ref 4–40)
BASOPHILS # BLD AUTO: 0.06 K/UL — SIGNIFICANT CHANGE UP (ref 0–0.2)
BASOPHILS # BLD AUTO: 0.06 K/UL — SIGNIFICANT CHANGE UP (ref 0–0.2)
BASOPHILS NFR BLD AUTO: 0.7 % — SIGNIFICANT CHANGE UP (ref 0–2)
BASOPHILS NFR BLD AUTO: 0.7 % — SIGNIFICANT CHANGE UP (ref 0–2)
BILIRUB SERPL-MCNC: 0.3 MG/DL — SIGNIFICANT CHANGE UP (ref 0.2–1.2)
BILIRUB SERPL-MCNC: 0.3 MG/DL — SIGNIFICANT CHANGE UP (ref 0.2–1.2)
BUN SERPL-MCNC: 18 MG/DL — SIGNIFICANT CHANGE UP (ref 7–23)
BUN SERPL-MCNC: 18 MG/DL — SIGNIFICANT CHANGE UP (ref 7–23)
CALCIUM SERPL-MCNC: 9.1 MG/DL — SIGNIFICANT CHANGE UP (ref 8.4–10.5)
CALCIUM SERPL-MCNC: 9.1 MG/DL — SIGNIFICANT CHANGE UP (ref 8.4–10.5)
CHLORIDE SERPL-SCNC: 101 MMOL/L — SIGNIFICANT CHANGE UP (ref 98–107)
CHLORIDE SERPL-SCNC: 101 MMOL/L — SIGNIFICANT CHANGE UP (ref 98–107)
CHOLEST SERPL-MCNC: 157 MG/DL — SIGNIFICANT CHANGE UP
CHOLEST SERPL-MCNC: 157 MG/DL — SIGNIFICANT CHANGE UP
CO2 SERPL-SCNC: 27 MMOL/L — SIGNIFICANT CHANGE UP (ref 22–31)
CO2 SERPL-SCNC: 27 MMOL/L — SIGNIFICANT CHANGE UP (ref 22–31)
CREAT SERPL-MCNC: 1.06 MG/DL — SIGNIFICANT CHANGE UP (ref 0.5–1.3)
CREAT SERPL-MCNC: 1.06 MG/DL — SIGNIFICANT CHANGE UP (ref 0.5–1.3)
EGFR: 80 ML/MIN/1.73M2 — SIGNIFICANT CHANGE UP
EGFR: 80 ML/MIN/1.73M2 — SIGNIFICANT CHANGE UP
EOSINOPHIL # BLD AUTO: 0.33 K/UL — SIGNIFICANT CHANGE UP (ref 0–0.5)
EOSINOPHIL # BLD AUTO: 0.33 K/UL — SIGNIFICANT CHANGE UP (ref 0–0.5)
EOSINOPHIL NFR BLD AUTO: 3.7 % — SIGNIFICANT CHANGE UP (ref 0–6)
EOSINOPHIL NFR BLD AUTO: 3.7 % — SIGNIFICANT CHANGE UP (ref 0–6)
ESTIMATED AVERAGE GLUCOSE: 120 — SIGNIFICANT CHANGE UP
ESTIMATED AVERAGE GLUCOSE: 120 — SIGNIFICANT CHANGE UP
GLUCOSE SERPL-MCNC: 110 MG/DL — HIGH (ref 70–99)
GLUCOSE SERPL-MCNC: 110 MG/DL — HIGH (ref 70–99)
HCT VFR BLD CALC: 40.1 % — SIGNIFICANT CHANGE UP (ref 39–50)
HCT VFR BLD CALC: 40.1 % — SIGNIFICANT CHANGE UP (ref 39–50)
HDLC SERPL-MCNC: 47 MG/DL — SIGNIFICANT CHANGE UP
HDLC SERPL-MCNC: 47 MG/DL — SIGNIFICANT CHANGE UP
HGB BLD-MCNC: 12.6 G/DL — LOW (ref 13–17)
HGB BLD-MCNC: 12.6 G/DL — LOW (ref 13–17)
IANC: 4.98 K/UL — SIGNIFICANT CHANGE UP (ref 1.8–7.4)
IANC: 4.98 K/UL — SIGNIFICANT CHANGE UP (ref 1.8–7.4)
IMM GRANULOCYTES NFR BLD AUTO: 0.5 % — SIGNIFICANT CHANGE UP (ref 0–0.9)
IMM GRANULOCYTES NFR BLD AUTO: 0.5 % — SIGNIFICANT CHANGE UP (ref 0–0.9)
LIPID PNL WITH DIRECT LDL SERPL: 87 MG/DL — SIGNIFICANT CHANGE UP
LIPID PNL WITH DIRECT LDL SERPL: 87 MG/DL — SIGNIFICANT CHANGE UP
LYMPHOCYTES # BLD AUTO: 2.36 K/UL — SIGNIFICANT CHANGE UP (ref 1–3.3)
LYMPHOCYTES # BLD AUTO: 2.36 K/UL — SIGNIFICANT CHANGE UP (ref 1–3.3)
LYMPHOCYTES # BLD AUTO: 26.8 % — SIGNIFICANT CHANGE UP (ref 13–44)
LYMPHOCYTES # BLD AUTO: 26.8 % — SIGNIFICANT CHANGE UP (ref 13–44)
MCHC RBC-ENTMCNC: 23.3 PG — LOW (ref 27–34)
MCHC RBC-ENTMCNC: 23.3 PG — LOW (ref 27–34)
MCHC RBC-ENTMCNC: 31.4 GM/DL — LOW (ref 32–36)
MCHC RBC-ENTMCNC: 31.4 GM/DL — LOW (ref 32–36)
MCV RBC AUTO: 74.1 FL — LOW (ref 80–100)
MCV RBC AUTO: 74.1 FL — LOW (ref 80–100)
MONOCYTES # BLD AUTO: 1.04 K/UL — HIGH (ref 0–0.9)
MONOCYTES # BLD AUTO: 1.04 K/UL — HIGH (ref 0–0.9)
MONOCYTES NFR BLD AUTO: 11.8 % — SIGNIFICANT CHANGE UP (ref 2–14)
MONOCYTES NFR BLD AUTO: 11.8 % — SIGNIFICANT CHANGE UP (ref 2–14)
NEUTROPHILS # BLD AUTO: 4.98 K/UL — SIGNIFICANT CHANGE UP (ref 1.8–7.4)
NEUTROPHILS # BLD AUTO: 4.98 K/UL — SIGNIFICANT CHANGE UP (ref 1.8–7.4)
NEUTROPHILS NFR BLD AUTO: 56.5 % — SIGNIFICANT CHANGE UP (ref 43–77)
NEUTROPHILS NFR BLD AUTO: 56.5 % — SIGNIFICANT CHANGE UP (ref 43–77)
NON HDL CHOLESTEROL: 110 MG/DL — SIGNIFICANT CHANGE UP
NON HDL CHOLESTEROL: 110 MG/DL — SIGNIFICANT CHANGE UP
NRBC # BLD: 0 /100 WBCS — SIGNIFICANT CHANGE UP (ref 0–0)
NRBC # BLD: 0 /100 WBCS — SIGNIFICANT CHANGE UP (ref 0–0)
NRBC # FLD: 0 K/UL — SIGNIFICANT CHANGE UP (ref 0–0)
NRBC # FLD: 0 K/UL — SIGNIFICANT CHANGE UP (ref 0–0)
PLATELET # BLD AUTO: 167 K/UL — SIGNIFICANT CHANGE UP (ref 150–400)
PLATELET # BLD AUTO: 167 K/UL — SIGNIFICANT CHANGE UP (ref 150–400)
POTASSIUM SERPL-MCNC: 4.5 MMOL/L — SIGNIFICANT CHANGE UP (ref 3.5–5.3)
POTASSIUM SERPL-MCNC: 4.5 MMOL/L — SIGNIFICANT CHANGE UP (ref 3.5–5.3)
POTASSIUM SERPL-SCNC: 4.5 MMOL/L — SIGNIFICANT CHANGE UP (ref 3.5–5.3)
POTASSIUM SERPL-SCNC: 4.5 MMOL/L — SIGNIFICANT CHANGE UP (ref 3.5–5.3)
PROLACTIN SERPL-MCNC: 53.3 NG/ML — HIGH (ref 4.1–18.4)
PROLACTIN SERPL-MCNC: 53.3 NG/ML — HIGH (ref 4.1–18.4)
PROT SERPL-MCNC: 6.9 G/DL — SIGNIFICANT CHANGE UP (ref 6–8.3)
PROT SERPL-MCNC: 6.9 G/DL — SIGNIFICANT CHANGE UP (ref 6–8.3)
RBC # BLD: 5.41 M/UL — SIGNIFICANT CHANGE UP (ref 4.2–5.8)
RBC # BLD: 5.41 M/UL — SIGNIFICANT CHANGE UP (ref 4.2–5.8)
RBC # FLD: 15.2 % — HIGH (ref 10.3–14.5)
RBC # FLD: 15.2 % — HIGH (ref 10.3–14.5)
SODIUM SERPL-SCNC: 138 MMOL/L — SIGNIFICANT CHANGE UP (ref 135–145)
SODIUM SERPL-SCNC: 138 MMOL/L — SIGNIFICANT CHANGE UP (ref 135–145)
TRIGL SERPL-MCNC: 116 MG/DL — SIGNIFICANT CHANGE UP
TRIGL SERPL-MCNC: 116 MG/DL — SIGNIFICANT CHANGE UP
WBC # BLD: 8.81 K/UL — SIGNIFICANT CHANGE UP (ref 3.8–10.5)
WBC # BLD: 8.81 K/UL — SIGNIFICANT CHANGE UP (ref 3.8–10.5)
WBC # FLD AUTO: 8.81 K/UL — SIGNIFICANT CHANGE UP (ref 3.8–10.5)
WBC # FLD AUTO: 8.81 K/UL — SIGNIFICANT CHANGE UP (ref 3.8–10.5)

## 2023-11-15 PROCEDURE — 93010 ELECTROCARDIOGRAM REPORT: CPT

## 2023-11-15 PROCEDURE — 99231 SBSQ HOSP IP/OBS SF/LOW 25: CPT | Mod: GC

## 2023-11-15 RX ORDER — ASPIRIN/CALCIUM CARB/MAGNESIUM 324 MG
1 TABLET ORAL
Qty: 30 | Refills: 0
Start: 2023-11-15 | End: 2023-12-14

## 2023-11-15 RX ORDER — RISPERIDONE 4 MG/1
1 TABLET ORAL
Qty: 30 | Refills: 0
Start: 2023-11-15 | End: 2023-12-14

## 2023-11-15 RX ADMIN — Medication 30 MILLILITER(S): at 05:55

## 2023-11-15 RX ADMIN — Medication 1 PATCH: at 11:48

## 2023-11-15 RX ADMIN — Medication 4 MILLIGRAM(S): at 03:46

## 2023-11-15 RX ADMIN — Medication 4 MILLIGRAM(S): at 21:50

## 2023-11-15 RX ADMIN — RISPERIDONE 4 MILLIGRAM(S): 4 TABLET ORAL at 21:50

## 2023-11-15 RX ADMIN — Medication 4 MILLIGRAM(S): at 11:49

## 2023-11-15 NOTE — BH INPATIENT PSYCHIATRY PROGRESS NOTE - CURRENT MEDICATION
MEDICATIONS  (STANDING):  influenza   Vaccine 0.5 milliLiter(s) IntraMuscular once  risperiDONE   Tablet 4 milliGRAM(s) Oral at bedtime    MEDICATIONS  (PRN):  diphenhydrAMINE 50 milliGRAM(s) Oral every 4 hours PRN agitation / eps ppx  diphenhydrAMINE Injectable 50 milliGRAM(s) IntraMuscular once PRN severe agitation / eps ppx  haloperidol     Tablet 5 milliGRAM(s) Oral every 4 hours PRN agitation  haloperidol    Injectable 5 milliGRAM(s) IntraMuscular Once PRN severe agitation  LORazepam     Tablet 2 milliGRAM(s) Oral every 4 hours PRN Agitation  LORazepam   Injectable 2 milliGRAM(s) IntraMuscular Once PRN severe agitation  nicotine  Polacrilex Gum 4 milliGRAM(s) Oral every 2 hours PRN smoking cessation  nicotine - 21 mG/24Hr(s) Patch 1 Patch Transdermal daily PRN nrt  
MEDICATIONS  (STANDING):  influenza   Vaccine 0.5 milliLiter(s) IntraMuscular once  risperiDONE   Tablet 1 milliGRAM(s) Oral at bedtime    MEDICATIONS  (PRN):  diphenhydrAMINE 50 milliGRAM(s) Oral every 4 hours PRN agitation / eps ppx  diphenhydrAMINE Injectable 50 milliGRAM(s) IntraMuscular once PRN severe agitation / eps ppx  haloperidol     Tablet 5 milliGRAM(s) Oral every 4 hours PRN agitation  haloperidol    Injectable 5 milliGRAM(s) IntraMuscular Once PRN severe agitation  LORazepam     Tablet 2 milliGRAM(s) Oral every 4 hours PRN Agitation  LORazepam   Injectable 2 milliGRAM(s) IntraMuscular Once PRN severe agitation  nicotine  Polacrilex Gum 2 milliGRAM(s) Oral every 4 hours PRN Nicotine craving  nicotine - 21 mG/24Hr(s) Patch 1 Patch Transdermal daily PRN nrt  
MEDICATIONS  (STANDING):  influenza   Vaccine 0.5 milliLiter(s) IntraMuscular once  risperiDONE   Tablet 3 milliGRAM(s) Oral at bedtime    MEDICATIONS  (PRN):  diphenhydrAMINE 50 milliGRAM(s) Oral every 4 hours PRN agitation / eps ppx  diphenhydrAMINE Injectable 50 milliGRAM(s) IntraMuscular once PRN severe agitation / eps ppx  haloperidol     Tablet 5 milliGRAM(s) Oral every 4 hours PRN agitation  haloperidol    Injectable 5 milliGRAM(s) IntraMuscular Once PRN severe agitation  LORazepam     Tablet 2 milliGRAM(s) Oral every 4 hours PRN Agitation  LORazepam   Injectable 2 milliGRAM(s) IntraMuscular Once PRN severe agitation  nicotine  Polacrilex Gum 4 milliGRAM(s) Oral every 2 hours PRN smoking cessation  nicotine - 21 mG/24Hr(s) Patch 1 Patch Transdermal daily PRN nrt  
MEDICATIONS  (STANDING):  influenza   Vaccine 0.5 milliLiter(s) IntraMuscular once  risperiDONE   Tablet 4 milliGRAM(s) Oral at bedtime    MEDICATIONS  (PRN):  diphenhydrAMINE 50 milliGRAM(s) Oral every 4 hours PRN agitation / eps ppx  diphenhydrAMINE Injectable 50 milliGRAM(s) IntraMuscular once PRN severe agitation / eps ppx  haloperidol     Tablet 5 milliGRAM(s) Oral every 4 hours PRN agitation  haloperidol    Injectable 5 milliGRAM(s) IntraMuscular Once PRN severe agitation  LORazepam     Tablet 2 milliGRAM(s) Oral every 4 hours PRN Agitation  LORazepam   Injectable 2 milliGRAM(s) IntraMuscular Once PRN severe agitation  nicotine  Polacrilex Gum 4 milliGRAM(s) Oral every 2 hours PRN smoking cessation  nicotine - 21 mG/24Hr(s) Patch 1 Patch Transdermal daily PRN nrt  
MEDICATIONS  (STANDING):  influenza   Vaccine 0.5 milliLiter(s) IntraMuscular once  risperiDONE   Tablet 4 milliGRAM(s) Oral at bedtime    MEDICATIONS  (PRN):  diphenhydrAMINE 50 milliGRAM(s) Oral every 4 hours PRN agitation / eps ppx  diphenhydrAMINE Injectable 50 milliGRAM(s) IntraMuscular once PRN severe agitation / eps ppx  haloperidol     Tablet 5 milliGRAM(s) Oral every 4 hours PRN agitation  haloperidol    Injectable 5 milliGRAM(s) IntraMuscular Once PRN severe agitation  LORazepam     Tablet 2 milliGRAM(s) Oral every 4 hours PRN Agitation  LORazepam   Injectable 2 milliGRAM(s) IntraMuscular Once PRN severe agitation  nicotine  Polacrilex Gum 4 milliGRAM(s) Oral every 2 hours PRN smoking cessation  nicotine - 21 mG/24Hr(s) Patch 1 Patch Transdermal daily PRN nrt  
MEDICATIONS  (STANDING):  influenza   Vaccine 0.5 milliLiter(s) IntraMuscular once  risperiDONE   Tablet 2 milliGRAM(s) Oral at bedtime    MEDICATIONS  (PRN):  diphenhydrAMINE 50 milliGRAM(s) Oral every 4 hours PRN agitation / eps ppx  diphenhydrAMINE Injectable 50 milliGRAM(s) IntraMuscular once PRN severe agitation / eps ppx  haloperidol     Tablet 5 milliGRAM(s) Oral every 4 hours PRN agitation  haloperidol    Injectable 5 milliGRAM(s) IntraMuscular Once PRN severe agitation  LORazepam     Tablet 2 milliGRAM(s) Oral every 4 hours PRN Agitation  LORazepam   Injectable 2 milliGRAM(s) IntraMuscular Once PRN severe agitation  nicotine  Polacrilex Gum 4 milliGRAM(s) Oral every 2 hours PRN smoking cessation  nicotine - 21 mG/24Hr(s) Patch 1 Patch Transdermal daily PRN nrt  
MEDICATIONS  (STANDING):  influenza   Vaccine 0.5 milliLiter(s) IntraMuscular once  risperiDONE   Tablet 4 milliGRAM(s) Oral at bedtime    MEDICATIONS  (PRN):  diphenhydrAMINE 50 milliGRAM(s) Oral every 4 hours PRN agitation / eps ppx  diphenhydrAMINE Injectable 50 milliGRAM(s) IntraMuscular once PRN severe agitation / eps ppx  haloperidol     Tablet 5 milliGRAM(s) Oral every 4 hours PRN agitation  haloperidol    Injectable 5 milliGRAM(s) IntraMuscular Once PRN severe agitation  LORazepam     Tablet 2 milliGRAM(s) Oral every 4 hours PRN Agitation  LORazepam   Injectable 2 milliGRAM(s) IntraMuscular Once PRN severe agitation  nicotine  Polacrilex Gum 4 milliGRAM(s) Oral every 2 hours PRN smoking cessation  nicotine - 21 mG/24Hr(s) Patch 1 Patch Transdermal daily PRN nrt  

## 2023-11-15 NOTE — BH INPATIENT PSYCHIATRY PROGRESS NOTE - MSE UNSTRUCTURED FT
This is a man wearing street clothes. He walked with a normal gait. He made fair eye contact. He was fairly related to the interviewers. He spoke fluently in English at a normal rhythm and volume but with increased rate. His reported mood was "great." His affect was elevated, stable, full. His thought process was overall linear but overinclusive. He denied SIIP and HIIP. He denied paranoia. There was no evidence or endorsement of perceptual disturbances. Insight is improving. Judgment is fair. Impulse control was intact.

## 2023-11-15 NOTE — BH INPATIENT PSYCHIATRY PROGRESS NOTE - NSTXDCOPLKDATEEST_PSY_ALL_CORE
02-Nov-2023
27-Oct-2023
02-Nov-2023
27-Oct-2023
02-Nov-2023
27-Oct-2023
02-Nov-2023
02-Nov-2023
27-Oct-2023
27-Oct-2023

## 2023-11-15 NOTE — BH INPATIENT PSYCHIATRY PROGRESS NOTE - NSBHCHARTREVIEWLAB_PSY_A_CORE FT
12.5   7.05  )-----------( 177      ( 02 Nov 2023 19:00 )             40.1     11-02    139  |  104  |  17  ----------------------------<  107<H>  3.7   |  24  |  0.97    Ca    9.1      02 Nov 2023 19:00    TPro  6.9  /  Alb  4.3  /  TBili  0.4  /  DBili  x   /  AST  19  /  ALT  15  /  AlkPhos  107  11-02  
                      12.6   8.81  )-----------( 167      ( 15 Nov 2023 09:33 )             40.1     11-15    138  |  101  |  18  ----------------------------<  110<H>  4.5   |  27  |  1.06    Ca    9.1      15 Nov 2023 09:33    TPro  6.9  /  Alb  4.2  /  TBili  0.3  /  DBili  x   /  AST  16  /  ALT  26  /  AlkPhos  114  11-15

## 2023-11-15 NOTE — BH INPATIENT PSYCHIATRY PROGRESS NOTE - NSTXDCOPLKDATETRGT_PSY_ALL_CORE
03-Nov-2023
03-Nov-2023
09-Nov-2023
03-Nov-2023
09-Nov-2023
03-Nov-2023
09-Nov-2023
09-Nov-2023
03-Nov-2023
09-Nov-2023

## 2023-11-15 NOTE — BH INPATIENT PSYCHIATRY PROGRESS NOTE - PRN MEDS
MEDICATIONS  (PRN):  diphenhydrAMINE 50 milliGRAM(s) Oral every 4 hours PRN agitation / eps ppx  diphenhydrAMINE Injectable 50 milliGRAM(s) IntraMuscular once PRN severe agitation / eps ppx  haloperidol     Tablet 5 milliGRAM(s) Oral every 4 hours PRN agitation  haloperidol    Injectable 5 milliGRAM(s) IntraMuscular Once PRN severe agitation  LORazepam     Tablet 2 milliGRAM(s) Oral every 4 hours PRN Agitation  LORazepam   Injectable 2 milliGRAM(s) IntraMuscular Once PRN severe agitation  nicotine  Polacrilex Gum 4 milliGRAM(s) Oral every 2 hours PRN smoking cessation  nicotine - 21 mG/24Hr(s) Patch 1 Patch Transdermal daily PRN nrt  
MEDICATIONS  (PRN):  diphenhydrAMINE 50 milliGRAM(s) Oral every 4 hours PRN agitation / eps ppx  diphenhydrAMINE Injectable 50 milliGRAM(s) IntraMuscular once PRN severe agitation / eps ppx  haloperidol     Tablet 5 milliGRAM(s) Oral every 4 hours PRN agitation  haloperidol    Injectable 5 milliGRAM(s) IntraMuscular Once PRN severe agitation  LORazepam     Tablet 2 milliGRAM(s) Oral every 4 hours PRN Agitation  LORazepam   Injectable 2 milliGRAM(s) IntraMuscular Once PRN severe agitation  nicotine  Polacrilex Gum 2 milliGRAM(s) Oral every 4 hours PRN Nicotine craving  nicotine - 21 mG/24Hr(s) Patch 1 Patch Transdermal daily PRN nrt  
MEDICATIONS  (PRN):  diphenhydrAMINE 50 milliGRAM(s) Oral every 4 hours PRN agitation / eps ppx  diphenhydrAMINE Injectable 50 milliGRAM(s) IntraMuscular once PRN severe agitation / eps ppx  haloperidol     Tablet 5 milliGRAM(s) Oral every 4 hours PRN agitation  haloperidol    Injectable 5 milliGRAM(s) IntraMuscular Once PRN severe agitation  LORazepam     Tablet 2 milliGRAM(s) Oral every 4 hours PRN Agitation  LORazepam   Injectable 2 milliGRAM(s) IntraMuscular Once PRN severe agitation  nicotine  Polacrilex Gum 4 milliGRAM(s) Oral every 2 hours PRN smoking cessation  nicotine - 21 mG/24Hr(s) Patch 1 Patch Transdermal daily PRN nrt  
MEDICATIONS  (PRN):  diphenhydrAMINE 50 milliGRAM(s) Oral every 4 hours PRN agitation / eps ppx  diphenhydrAMINE Injectable 50 milliGRAM(s) IntraMuscular once PRN severe agitation / eps ppx  haloperidol     Tablet 5 milliGRAM(s) Oral every 4 hours PRN agitation  haloperidol    Injectable 5 milliGRAM(s) IntraMuscular Once PRN severe agitation  LORazepam     Tablet 2 milliGRAM(s) Oral every 4 hours PRN Agitation  LORazepam   Injectable 2 milliGRAM(s) IntraMuscular Once PRN severe agitation  nicotine  Polacrilex Gum 4 milliGRAM(s) Oral every 2 hours PRN smoking cessation  nicotine - 21 mG/24Hr(s) Patch 1 Patch Transdermal daily PRN nrt

## 2023-11-15 NOTE — BH INPATIENT PSYCHIATRY PROGRESS NOTE - NSBHFUPINTERVALCCFT_PSY_A_CORE
f/u paranoia and psychosis
I'm not good.
f/u paranoia and psychosis
I'm alright.

## 2023-11-15 NOTE — BH INPATIENT PSYCHIATRY PROGRESS NOTE - NSTXDCOPLKINTERMD_PSY_ALL_CORE
collaborate with [facility]

## 2023-11-15 NOTE — BH INPATIENT PSYCHIATRY PROGRESS NOTE - NSTXVIOLNTINTERMD_PSY_ALL_CORE
medication management; interdisciplinary behavioral planning

## 2023-11-15 NOTE — BH INPATIENT PSYCHIATRY PROGRESS NOTE - NSICDXBHSECONDARYDX_PSY_ALL_CORE
Paranoia   F22  Suicidal ideation   R45.851  Homicidal ideation   R45.322  
Paranoia   F22  Suicidal ideation   R45.851  Homicidal ideation   R45.694  
Paranoia   F22  Suicidal ideation   R45.851  Homicidal ideation   R45.201  
Paranoia   F22  Suicidal ideation   R45.851  Homicidal ideation   R45.110  
Paranoia   F22  Suicidal ideation   R45.851  Homicidal ideation   R45.400  
Schizophrenia   F20.9  Suicidal ideation   R45.851  Homicidal ideation   R45.850  
Paranoia   F22  Suicidal ideation   R45.851  Homicidal ideation   R45.953

## 2023-11-15 NOTE — BH INPATIENT PSYCHIATRY PROGRESS NOTE - NSBHASSESSSUMMFT_PSY_ALL_CORE
This is a 63y/o man with no past medical history and a past psychiatric history of schizophrenia/schizoaffective disorder, multiple prior psychiatric hospitalizations (last [facility] Dec2022 for SI and CAH), and multiple prior SA (most recent Dec2022; via train), NSSIB (banging head; swallowing metal) admitted for SI (via train) and HI (toward neighbors) after EMS activated by  for threat of harm to neighbors.    Patient continues to report improvements to mood with future orientation. Patient continues to exhibit cluster B personality traits within the valence of narcissistic and antisocial personality disorder. He denied SIIP, HIIP, AVH, and paranoia. His admitting presentation was significant for paranoia (now improved and able to reality test), likely in the context of decompensated schizophrenia. He is overall responding well to risperidone.     Plan:  - admitted on 9.39  - routine observation  - encourage group and milieu therapy  - individual therapy  - continue risperidone 4mg PO qhs    PRNs:  - agitation ---> PO haloperidol 5mg, lorazepam 2mg, and diphenhydramine 50mg q4h  - severe agitation ---> IM haloperidol 5mg, lorazepam 2mg, and diphenhydramine 50mg q4h    Disposition:  - pending stabilization  - residence accepting of patient once stabilized ---> confirmed on 15Nov2023

## 2023-11-15 NOTE — BH INPATIENT PSYCHIATRY PROGRESS NOTE - NSBHCHARTREVIEWVS_PSY_A_CORE FT
Vital Signs Last 24 Hrs  T(C): 36.4 (11-13-23 @ 08:01), Max: 36.4 (11-13-23 @ 08:01)  T(F): 97.6 (11-13-23 @ 08:01), Max: 97.6 (11-13-23 @ 08:01)  HR: 96 (11-13-23 @ 08:01) (96 - 96)  BP: 105/71 (11-13-23 @ 08:01) (105/71 - 105/71)  BP(mean): --  RR: --  SpO2: --    
Vital Signs Last 24 Hrs  T(C): --  T(F): --  HR: --  BP: --  BP(mean): --  RR: --  SpO2: --    
Vital Signs Last 24 Hrs  T(C): 36.6 (11-10-23 @ 08:10), Max: 36.6 (11-10-23 @ 08:10)  T(F): 97.9 (11-10-23 @ 08:10), Max: 97.9 (11-10-23 @ 08:10)  HR: 85 (11-10-23 @ 08:10) (85 - 85)  BP: 115/78 (11-10-23 @ 08:10) (115/78 - 115/78)  BP(mean): --  RR: --  SpO2: --    
Vital Signs Last 24 Hrs  T(C): --  T(F): --  HR: --  BP: --  BP(mean): --  RR: --  SpO2: --    Orthostatic VS  11-02-23 @ 22:07  Lying BP: --/-- HR: --  Sitting BP: 144/78 HR: 82  Standing BP: 140/84 HR: 76  Site: --  Mode: --  
Vital Signs Last 24 Hrs  T(C): --  T(F): --  HR: --  BP: --  BP(mean): --  RR: --  SpO2: --    
Vital Signs Last 24 Hrs  T(C): 36.8 (11-15-23 @ 06:49), Max: 36.8 (11-15-23 @ 06:49)  T(F): 98.3 (11-15-23 @ 06:49), Max: 98.3 (11-15-23 @ 06:49)  HR: --  BP: --  BP(mean): --  RR: --  SpO2: --    Orthostatic VS  11-15-23 @ 06:49  Lying BP: --/-- HR: --  Sitting BP: 129/76 HR: 102  Standing BP: --/-- HR: --  Site: --  Mode: --  
Vital Signs Last 24 Hrs  T(C): 36.6 (11-14-23 @ 08:57), Max: 36.6 (11-14-23 @ 08:57)  T(F): 97.9 (11-14-23 @ 08:57), Max: 97.9 (11-14-23 @ 08:57)  HR: --  BP: 105/68 (11-14-23 @ 08:57) (105/68 - 105/68)  BP(mean): 106 (11-14-23 @ 08:57) (106 - 106)  RR: --  SpO2: --    
Vital Signs Last 24 Hrs  T(C): --  T(F): --  HR: --  BP: --  BP(mean): --  RR: --  SpO2: --

## 2023-11-15 NOTE — BH INPATIENT PSYCHIATRY PROGRESS NOTE - NSTXMEDICINTERMD_PSY_ALL_CORE
collaborate with SW and psych rehab

## 2023-11-15 NOTE — BH INPATIENT PSYCHIATRY PROGRESS NOTE - NSBHATTESTTYPEVISIT_PSY_A_CORE
Attending Only
Attending with Resident/Fellow/Student

## 2023-11-15 NOTE — BH INPATIENT PSYCHIATRY PROGRESS NOTE - NSBHATTESTCOMMENTATTENDFT_PSY_A_CORE
Agree with above.  In good behavioral control and with much improved insight.  Denies any psychotic symptoms and is not worrying about going home where his paranoia occurred and which he was previously concerned about.  Discharge tomorrow.

## 2023-11-15 NOTE — BH INPATIENT PSYCHIATRY PROGRESS NOTE - NSBHFUPINTERVALHXFT_PSY_A_CORE
Chart reviewed. Case discussed with interdisciplinary team. No acute events overnight. Adherent to standing risperidone.     Patient alert and oriented. Continues to report feeling well and looking forward to discharge. Denying SIIP, HIIP, and AVH. Denying paranoid ideation. Employing coping skills to handle distressing situations. Future oriented and hopeful. Chart reviewed. Case discussed with interdisciplinary team. No acute events overnight. Adherent to standing risperidone.     Patient alert and oriented. Continues to report feeling well and looking forward to discharge. Denying SIIP, HIIP, and AVH. Denying paranoid ideation. Denies worrying about facing people messing with him in his apartment anymore.  Employing coping skills to handle distressing situations. Future oriented and hopeful.

## 2023-11-15 NOTE — BH INPATIENT PSYCHIATRY PROGRESS NOTE - NSTXPSYCHODATEEST_PSY_ALL_CORE
03-Nov-2023
08-Nov-2023
08-Nov-2023
03-Nov-2023
08-Nov-2023

## 2023-11-15 NOTE — BH INPATIENT PSYCHIATRY PROGRESS NOTE - NSTXMEDICDATETRGT_PSY_ALL_CORE
16-Nov-2023
15-Nov-2023
10-Nov-2023
15-Nov-2023
10-Nov-2023
10-Nov-2023
16-Nov-2023
10-Nov-2023

## 2023-11-15 NOTE — BH INPATIENT PSYCHIATRY PROGRESS NOTE - NSTXVIOLNTDATETRGT_PSY_ALL_CORE
15-Nov-2023
15-Nov-2023
09-Nov-2023
15-Nov-2023

## 2023-11-15 NOTE — BH INPATIENT PSYCHIATRY PROGRESS NOTE - NSBHMETABOLIC_PSY_ALL_CORE_FT
BMI: BMI (kg/m2): 19.9 (11-02-23 @ 22:07)  HbA1c: A1C with Estimated Average Glucose Result: 5.8 % (11-15-23 @ 09:33)    Glucose:   BP: 105/68 (11-14-23 @ 08:57) (105/68 - 105/71)  Lipid Panel: Date/Time: 11-15-23 @ 09:33  Cholesterol, Serum: 157  Direct LDL: --  HDL Cholesterol, Serum: 47  Total Cholesterol/HDL Ration Measurement: --  Triglycerides, Serum: 116  
BMI: BMI (kg/m2): 19.9 (11-02-23 @ 22:07)  HbA1c:   Glucose:   BP: 125/75 (11-03-23 @ 08:39) (125/75 - 138/94)  Lipid Panel: 
BMI: BMI (kg/m2): 19.9 (11-02-23 @ 22:07)  HbA1c:   Glucose:   BP: --  Lipid Panel: 
BMI: BMI (kg/m2): 19.9 (11-02-23 @ 22:07)  HbA1c:   Glucose:   BP: 105/71 (11-13-23 @ 08:01) (105/71 - 113/58)  Lipid Panel: 
BMI: BMI (kg/m2): 19.9 (11-02-23 @ 22:07)  HbA1c:   Glucose:   BP: --  Lipid Panel: 
BMI: BMI (kg/m2): 19.9 (11-02-23 @ 22:07)  HbA1c:   Glucose:   BP: 115/78 (11-10-23 @ 08:10) (115/78 - 115/78)  Lipid Panel: 
BMI: BMI (kg/m2): 19.9 (11-02-23 @ 22:07)  HbA1c:   Glucose:   BP: --  Lipid Panel: 
BMI: BMI (kg/m2): 19.9 (11-02-23 @ 22:07)  HbA1c:   Glucose:   BP: 105/68 (11-14-23 @ 08:57) (105/68 - 105/71)  Lipid Panel: 
BMI: BMI (kg/m2): 19.9 (11-02-23 @ 22:07)  HbA1c:   Glucose:   BP: 125/75 (11-03-23 @ 08:39) (125/75 - 138/94)  Lipid Panel: 
BMI: BMI (kg/m2): 19.9 (11-02-23 @ 22:07)  HbA1c:   Glucose:   BP: --  Lipid Panel:

## 2023-11-15 NOTE — BH INPATIENT PSYCHIATRY PROGRESS NOTE - NSICDXBHPRIMARYDX_PSY_ALL_CORE
Schizophrenia   F20.9  
Nahum   F22  
Schizophrenia   F20.9  
Nahum   F22  
Schizophrenia   F20.9  
Nahum   F22  
Nahum   F22

## 2023-11-15 NOTE — BH INPATIENT PSYCHIATRY PROGRESS NOTE - NSBHCONSULTIPREASON_PSY_A_CORE
danger to others; mental illness expected to respond to inpatient care

## 2023-11-15 NOTE — BH INPATIENT PSYCHIATRY PROGRESS NOTE - NSICDXBHTERTIARYDX_PSY_ALL_CORE
R/O Narcissistic personality disorder   F60.81  R/O Antisocial personality disorder   F60.2  

## 2023-11-15 NOTE — BH INPATIENT PSYCHIATRY PROGRESS NOTE - NSTXMEDICDATEEST_PSY_ALL_CORE
03-Nov-2023
08-Nov-2023
03-Nov-2023
08-Nov-2023

## 2023-11-15 NOTE — BH INPATIENT PSYCHIATRY PROGRESS NOTE - NSTXVIOLNTDATEEST_PSY_ALL_CORE
02-Nov-2023
02-Nov-2023
08-Nov-2023
02-Nov-2023
08-Nov-2023
08-Nov-2023
02-Nov-2023

## 2023-11-15 NOTE — BH INPATIENT PSYCHIATRY PROGRESS NOTE - NSTXVIOLNTGOAL_PSY_ALL_CORE
Will be able to express understanding of at least one trigger to their aggressive behavior

## 2023-11-15 NOTE — BH INPATIENT PSYCHIATRY PROGRESS NOTE - NSTXPSYCHOINTERMD_PSY_ALL_CORE
medication management

## 2023-11-15 NOTE — BH INPATIENT PSYCHIATRY PROGRESS NOTE - NSTXMEDICPROGRES_PSY_ALL_CORE
No Change
Met - goal discontinued
No Change
No Change
Improving
No Change
No Change
Improving
Improving
No Change

## 2023-11-15 NOTE — BH INPATIENT PSYCHIATRY PROGRESS NOTE - NSBHMETABOLICLABS_PSY_ALL_CORE
All labs not within last 12 months, not ordered
Labs within last 12 months
All labs not within last 12 months, not ordered

## 2023-11-16 VITALS — TEMPERATURE: 98 F

## 2023-11-16 PROCEDURE — 99239 HOSP IP/OBS DSCHRG MGMT >30: CPT | Mod: GC

## 2023-11-16 RX ADMIN — Medication 4 MILLIGRAM(S): at 06:30

## 2023-11-16 RX ADMIN — Medication 4 MILLIGRAM(S): at 00:55

## 2023-11-16 RX ADMIN — Medication 1 PATCH: at 07:58

## 2023-11-16 NOTE — BH INPATIENT PSYCHIATRY DISCHARGE NOTE - NSBHDCMEDICALFT_PSY_A_CORE
A1c elevated to 5.8%, counseled on lifestyle modifications  Prolactin elevated, counseled on outpatient medication options and warning signs for worsening levels

## 2023-11-16 NOTE — BH INPATIENT PSYCHIATRY DISCHARGE NOTE - NSBHDCRISKMITIGATEFT_PSY_ALL_CORE
The patient remains a moderate chronic risk of harm to self and others due to cluster B personality pathology, hx of prior hospitalizations, history of suicidality, and history of non-adherence. Acute risk factors included recent episode of psychotic symptoms, SI, and HI, now treated with inpatient hospitalization. Protective factors that mitigate acute risks include consistent denial of HIIP, future orientation (with plans to connect with family and work), lack of access to a gun, motivation to engage in future outpatient treatment, supportive family, adherence to medications while inpatient, and ability to engage in safety planning. Patient is agreeable to contact outpatient providers and , call 911 (via ) or [phone], or go to the nearest ED with any imminent safety concerns for self or others. Given the above, patient does not appear to constitute imminent threat to self or others and is appropriate for discharge with outpatient psychiatric services.
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
## 2023-11-16 NOTE — BH INPATIENT PSYCHIATRY DISCHARGE NOTE - NSBHASSESSSUMMFT_PSY_ALL_CORE
This is a 61y/o man with no past medical history and a past psychiatric history of schizophrenia/schizoaffective disorder, multiple prior psychiatric hospitalizations (last [facility] Dec2022 for SI and CAH), and multiple prior SA (most recent Dec2022; via train), NSSIB (banging head; swallowing metal) admitted for SI (via train) and HI (toward neighbors) after EMS activated by  for threat of harm to neighbors.    Patient continues to report improvements to mood with future orientation. Patient continues to exhibit cluster B personality traits within the valence of narcissistic and antisocial personality disorder. He denied SIIP, HIIP, AVH, and paranoia. His admitting presentation was significant for paranoia (now improved and able to reality test), likely in the context of decompensated schizophrenia. His psychotic symptoms are now in remission and he has excellent insight into paranoia. He is hopeful, future oriented, and dedicated to engage in outpatient treatment. Patient is agreeable to contact outpatient providers and , call 911 (via ) or [phone], or go to the nearest ED with any imminent safety concerns for self or others. Given the above, patient does not appear to constitute imminent threat to self or others and is appropriate for discharge with outpatient psychiatric services.    Plan:  - admitted on 9.39  - routine observation  - encourage group and milieu therapy  - individual therapy  - continue risperidone 4mg PO qhs    PRNs:  - agitation ---> PO haloperidol 5mg, lorazepam 2mg, and diphenhydramine 50mg q4h  - severe agitation ---> IM haloperidol 5mg, lorazepam 2mg, and diphenhydramine 50mg q4h    Disposition:  - discharge today  - residence accepting of patient once stabilized ---> confirmed on 15Nov2023
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
## 2023-11-16 NOTE — BH INPATIENT PSYCHIATRY DISCHARGE NOTE - NSBHFUPINTERVALHXFT_PSY_A_CORE
Discharge Progress Note Date and Time: 11-16-23 @ 11:43    Chart reviewed. Case discussed with interdisciplinary team. No acute events overnight. Adherent to standing risperidone.     Patient alert and oriented. Continues to report feeling well and looking forward to discharge. Denying SIIP, HIIP, and AVH. Denying paranoid ideation. Denies worrying about facing people messing with him in his apartment anymore and states that his neighbors never were doing anything to him, that it was all in his head. Employing coping skills to handle distressing situations. Future oriented and hopeful. Psychoeducation and counseling provided regarding sleep hygiene, nutrition, medication adherence, and exercise. Made aware that his A1c was elevated to 5.8 and that his prolactin was elevated. Counseled on medication and lifestyle changes to be pursued outpatient. Patient is agreeable to contact outpatient providers and , call 911 (via ) or 988, or go to the nearest ED with any imminent safety concerns for self or others.

## 2023-11-16 NOTE — BH INPATIENT PSYCHIATRY DISCHARGE NOTE - OTHER PAST PSYCHIATRIC HISTORY (INCLUDE DETAILS REGARDING ONSET, COURSE OF ILLNESS, INPATIENT/OUTPATIENT TREATMENT)
Per  ED Note:  Multiple past psychiatric hospitalizations (most recently at [facility] in 12/22)  Multiple past suicide attempts (most recently in 12/22, reports aborted attempt where he was going to jump on train tracks and called EMS)  No current outpatient treatment

## 2023-11-16 NOTE — BH INPATIENT PSYCHIATRY DISCHARGE NOTE - NSDCMRMEDTOKEN_GEN_ALL_CORE_FT
Aspirin Enteric Coated 81 mg oral delayed release tablet: 1 tab(s) orally once a day  risperiDONE 4 mg oral tablet: 1 tab(s) orally once a day (at bedtime)

## 2023-11-16 NOTE — BH INPATIENT PSYCHIATRY DISCHARGE NOTE - NSBHMETABOLIC_PSY_ALL_CORE_FT
BMI: BMI (kg/m2): 19.9 (11-02-23 @ 22:07)  HbA1c: A1C with Estimated Average Glucose Result: 5.8 % (11-15-23 @ 09:33)    Glucose:   BP: 105/68 (11-14-23 @ 08:57) (105/68 - 105/68)  Lipid Panel: Date/Time: 11-15-23 @ 09:33  Cholesterol, Serum: 157  Direct LDL: --  HDL Cholesterol, Serum: 47  Total Cholesterol/HDL Ration Measurement: --  Triglycerides, Serum: 116

## 2023-11-16 NOTE — BH INPATIENT PSYCHIATRY DISCHARGE NOTE - MSE UNSTRUCTURED FT
This is a man wearing street clothes. He walked with a normal gait. He made fair eye contact. He was fairly related to the interviewers. He spoke fluently in English at a normal rhythm and volume but with increased rate. His reported mood was "great." His affect was elevated, stable, full. His thought process was overall linear but overinclusive. He denied SIIP and HIIP. He denied paranoia. There was no evidence or endorsement of perceptual disturbances. Insight is good. Judgment is fair. Impulse control was intact.

## 2023-11-16 NOTE — BH INPATIENT PSYCHIATRY DISCHARGE NOTE - ATTENDING DISCHARGE PHYSICAL EXAMINATION:
Agree with above.  Patient seen individually for discharge day management. Greater than 30 minutes was spent with patient, staff and charting as part of discharge day management. I was physically present during the service to the patient and personally examined the patient and I was directly involved in the management plan and recommendations of the care provided to the patient.  Patient has euthymic, happy affect and reports good mood.  Denies SI.  No signs of paranoia and denies delusions specifically which he came in with, sees these former beliefs as part of his mental illness.  Denies AH.

## 2023-11-16 NOTE — BH INPATIENT PSYCHIATRY DISCHARGE NOTE - DESCRIPTION
Quality 110: Preventive Care And Screening: Influenza Immunization: Influenza immunization was not ordered or administered, reason not given Detail Level: Detailed see HPI

## 2023-12-07 NOTE — SOCIAL WORK POST DISCHARGE FOLLOW UP NOTE - NSBHSWFOLLOWUP_PSY_ALL_CORE_FT
SW left message for CM. staff at [facility] reported pt called to cancel his appt and stated he was making his own arrangements. Pt was not at risk of S/H/I/P/I at discharge. SW left message for CM. staff at [facility] reported pt called to cancel his appt and stated he was making his own arrangements. Pt was not at risk of S/H/I/P/I at discharge.

## 2024-07-15 NOTE — BH PATIENT PROFILE - INFORMATION PROVIDED TO:
LVM on today in regards to patient canceling MRI that was ordered by Dr. Shankar.  Patient will need testing done before her follow up appointment. If not pt will have to reschedule her appointment.    patient